# Patient Record
Sex: MALE | Race: BLACK OR AFRICAN AMERICAN | NOT HISPANIC OR LATINO | Employment: STUDENT | URBAN - METROPOLITAN AREA
[De-identification: names, ages, dates, MRNs, and addresses within clinical notes are randomized per-mention and may not be internally consistent; named-entity substitution may affect disease eponyms.]

---

## 2021-04-22 ENCOUNTER — TELEPHONE (OUTPATIENT)
Dept: ORTHOPEDICS | Facility: CLINIC | Age: 21
End: 2021-04-22

## 2021-04-22 ENCOUNTER — HOSPITAL ENCOUNTER (OUTPATIENT)
Dept: RADIOLOGY | Facility: HOSPITAL | Age: 21
Discharge: HOME | End: 2021-04-22
Attending: ORTHOPAEDIC SURGERY
Payer: COMMERCIAL

## 2021-04-22 ENCOUNTER — OFFICE VISIT (OUTPATIENT)
Dept: ORTHOPEDICS | Facility: CLINIC | Age: 21
End: 2021-04-22
Payer: COMMERCIAL

## 2021-04-22 DIAGNOSIS — M25.562 LEFT KNEE PAIN, UNSPECIFIED CHRONICITY: Primary | ICD-10-CM

## 2021-04-22 DIAGNOSIS — Z98.890 HX OF LEFT KNEE SURGERY: ICD-10-CM

## 2021-04-22 DIAGNOSIS — M23.50 CHRONIC INSTABILITY OF KNEE, UNSPECIFIED LATERALITY: ICD-10-CM

## 2021-04-22 DIAGNOSIS — M25.562 LEFT KNEE PAIN, UNSPECIFIED CHRONICITY: ICD-10-CM

## 2021-04-22 DIAGNOSIS — M23.52 RECURRENT LEFT KNEE INSTABILITY: Primary | ICD-10-CM

## 2021-04-22 DIAGNOSIS — Q74.1 BIPARTITE PATELLA: ICD-10-CM

## 2021-04-22 PROCEDURE — 99203 OFFICE O/P NEW LOW 30 MIN: CPT | Mod: S$GLB,,, | Performed by: ORTHOPAEDIC SURGERY

## 2021-04-22 PROCEDURE — 73562 X-RAY EXAM OF KNEE 3: CPT | Mod: 26,RT,, | Performed by: RADIOLOGY

## 2021-04-22 PROCEDURE — 1125F AMNT PAIN NOTED PAIN PRSNT: CPT | Mod: S$GLB,,, | Performed by: ORTHOPAEDIC SURGERY

## 2021-04-22 PROCEDURE — 99999 PR PBB SHADOW E&M-NEW PATIENT-LVL II: ICD-10-PCS | Mod: PBBFAC,,, | Performed by: ORTHOPAEDIC SURGERY

## 2021-04-22 PROCEDURE — 99203 PR OFFICE/OUTPT VISIT, NEW, LEVL III, 30-44 MIN: ICD-10-PCS | Mod: S$GLB,,, | Performed by: ORTHOPAEDIC SURGERY

## 2021-04-22 PROCEDURE — 73564 X-RAY EXAM KNEE 4 OR MORE: CPT | Mod: 26,LT,, | Performed by: RADIOLOGY

## 2021-04-22 PROCEDURE — 1125F PR PAIN SEVERITY QUANTIFIED, PAIN PRESENT: ICD-10-PCS | Mod: S$GLB,,, | Performed by: ORTHOPAEDIC SURGERY

## 2021-04-22 PROCEDURE — 73562 XR KNEE ORTHO LEFT WITH FLEXION: ICD-10-PCS | Mod: 26,RT,, | Performed by: RADIOLOGY

## 2021-04-22 PROCEDURE — 99999 PR PBB SHADOW E&M-NEW PATIENT-LVL II: CPT | Mod: PBBFAC,,, | Performed by: ORTHOPAEDIC SURGERY

## 2021-04-22 PROCEDURE — 73564 XR KNEE ORTHO LEFT WITH FLEXION: ICD-10-PCS | Mod: 26,LT,, | Performed by: RADIOLOGY

## 2021-04-22 PROCEDURE — 73564 X-RAY EXAM KNEE 4 OR MORE: CPT | Mod: TC,LT

## 2021-04-23 ENCOUNTER — TELEPHONE (OUTPATIENT)
Dept: ORTHOPEDICS | Facility: CLINIC | Age: 21
End: 2021-04-23

## 2021-04-28 ENCOUNTER — OFFICE VISIT (OUTPATIENT)
Dept: ORTHOPEDICS | Facility: CLINIC | Age: 21
End: 2021-04-28
Payer: COMMERCIAL

## 2021-04-28 ENCOUNTER — HOSPITAL ENCOUNTER (OUTPATIENT)
Dept: RADIOLOGY | Facility: HOSPITAL | Age: 21
Discharge: HOME | End: 2021-04-28
Attending: PHYSICIAN ASSISTANT
Payer: COMMERCIAL

## 2021-04-28 DIAGNOSIS — M23.50 CHRONIC INSTABILITY OF KNEE, UNSPECIFIED LATERALITY: ICD-10-CM

## 2021-04-28 DIAGNOSIS — M23.52 RECURRENT LEFT KNEE INSTABILITY: Primary | ICD-10-CM

## 2021-04-28 DIAGNOSIS — Z98.890 HX OF LEFT KNEE SURGERY: ICD-10-CM

## 2021-04-28 DIAGNOSIS — Q74.1 BIPARTITE PATELLA: ICD-10-CM

## 2021-04-28 PROCEDURE — 99999 PR PBB SHADOW E&M-EST. PATIENT-LVL II: ICD-10-PCS | Mod: PBBFAC,,, | Performed by: ORTHOPAEDIC SURGERY

## 2021-04-28 PROCEDURE — 73721 MRI KNEE WITHOUT CONTRAST LEFT: ICD-10-PCS | Mod: 26,LT,, | Performed by: RADIOLOGY

## 2021-04-28 PROCEDURE — 73721 MRI JNT OF LWR EXTRE W/O DYE: CPT | Mod: 26,LT,, | Performed by: RADIOLOGY

## 2021-04-28 PROCEDURE — 73721 MRI JNT OF LWR EXTRE W/O DYE: CPT | Mod: TC,LT

## 2021-04-28 PROCEDURE — 99214 OFFICE O/P EST MOD 30 MIN: CPT | Mod: S$GLB,,, | Performed by: ORTHOPAEDIC SURGERY

## 2021-04-28 PROCEDURE — 99214 PR OFFICE/OUTPT VISIT, EST, LEVL IV, 30-39 MIN: ICD-10-PCS | Mod: S$GLB,,, | Performed by: ORTHOPAEDIC SURGERY

## 2021-04-28 PROCEDURE — 1126F PR PAIN SEVERITY QUANTIFIED, NO PAIN PRESENT: ICD-10-PCS | Mod: S$GLB,,, | Performed by: ORTHOPAEDIC SURGERY

## 2021-04-28 PROCEDURE — 99999 PR PBB SHADOW E&M-EST. PATIENT-LVL II: CPT | Mod: PBBFAC,,, | Performed by: ORTHOPAEDIC SURGERY

## 2021-04-28 PROCEDURE — 1126F AMNT PAIN NOTED NONE PRSNT: CPT | Mod: S$GLB,,, | Performed by: ORTHOPAEDIC SURGERY

## 2021-05-11 ENCOUNTER — TELEPHONE (OUTPATIENT)
Dept: ORTHOPEDICS | Facility: CLINIC | Age: 21
End: 2021-05-11

## 2021-06-21 ENCOUNTER — OFFICE VISIT (OUTPATIENT)
Dept: ORTHOPEDICS | Facility: CLINIC | Age: 21
End: 2021-06-21
Payer: COMMERCIAL

## 2021-06-21 DIAGNOSIS — Z01.818 PREOP TESTING: ICD-10-CM

## 2021-06-21 DIAGNOSIS — Q74.1 BIPARTITE PATELLA: ICD-10-CM

## 2021-06-21 DIAGNOSIS — M23.52 RECURRENT LEFT KNEE INSTABILITY: Primary | ICD-10-CM

## 2021-06-21 DIAGNOSIS — Z98.890 HX OF LEFT KNEE SURGERY: ICD-10-CM

## 2021-06-21 PROCEDURE — 1126F PR PAIN SEVERITY QUANTIFIED, NO PAIN PRESENT: ICD-10-PCS | Mod: S$GLB,,, | Performed by: ORTHOPAEDIC SURGERY

## 2021-06-21 PROCEDURE — 99999 PR PBB SHADOW E&M-EST. PATIENT-LVL III: CPT | Mod: PBBFAC,,, | Performed by: ORTHOPAEDIC SURGERY

## 2021-06-21 PROCEDURE — 99999 PR PBB SHADOW E&M-EST. PATIENT-LVL III: ICD-10-PCS | Mod: PBBFAC,,, | Performed by: ORTHOPAEDIC SURGERY

## 2021-06-21 PROCEDURE — 1126F AMNT PAIN NOTED NONE PRSNT: CPT | Mod: S$GLB,,, | Performed by: ORTHOPAEDIC SURGERY

## 2021-06-21 PROCEDURE — 99213 PR OFFICE/OUTPT VISIT, EST, LEVL III, 20-29 MIN: ICD-10-PCS | Mod: S$GLB,,, | Performed by: ORTHOPAEDIC SURGERY

## 2021-06-21 PROCEDURE — 99213 OFFICE O/P EST LOW 20 MIN: CPT | Mod: S$GLB,,, | Performed by: ORTHOPAEDIC SURGERY

## 2021-06-23 ENCOUNTER — TELEPHONE (OUTPATIENT)
Dept: PREADMISSION TESTING | Facility: HOSPITAL | Age: 21
End: 2021-06-23

## 2021-06-23 VITALS — WEIGHT: 190 LBS | BODY MASS INDEX: 25.18 KG/M2 | HEIGHT: 73 IN

## 2021-06-23 DIAGNOSIS — Z01.818 PRE-OP TESTING: Primary | ICD-10-CM

## 2021-06-24 ENCOUNTER — ANESTHESIA EVENT (OUTPATIENT)
Dept: SURGERY | Facility: HOSPITAL | Age: 21
End: 2021-06-24
Payer: COMMERCIAL

## 2021-06-29 ENCOUNTER — LAB VISIT (OUTPATIENT)
Dept: OTOLARYNGOLOGY | Facility: CLINIC | Age: 21
End: 2021-06-29
Payer: COMMERCIAL

## 2021-06-29 ENCOUNTER — LAB VISIT (OUTPATIENT)
Dept: LAB | Facility: HOSPITAL | Age: 21
End: 2021-06-29
Attending: ORTHOPAEDIC SURGERY
Payer: COMMERCIAL

## 2021-06-29 DIAGNOSIS — Z01.818 PRE-OP TESTING: ICD-10-CM

## 2021-06-29 DIAGNOSIS — Z01.818 PREOP TESTING: ICD-10-CM

## 2021-06-29 LAB
APTT BLDCRRT: 27.2 SEC (ref 21–32)
BASOPHILS # BLD AUTO: 0.05 K/UL (ref 0–0.2)
BASOPHILS NFR BLD: 0.9 % (ref 0–1.9)
DIFFERENTIAL METHOD: ABNORMAL
EOSINOPHIL # BLD AUTO: 0.1 K/UL (ref 0–0.5)
EOSINOPHIL NFR BLD: 2.1 % (ref 0–8)
ERYTHROCYTE [DISTWIDTH] IN BLOOD BY AUTOMATED COUNT: 11.7 % (ref 11.5–14.5)
HCT VFR BLD AUTO: 42.2 % (ref 40–54)
HGB BLD-MCNC: 14.1 G/DL (ref 14–18)
IMM GRANULOCYTES # BLD AUTO: 0.02 K/UL (ref 0–0.04)
IMM GRANULOCYTES NFR BLD AUTO: 0.4 % (ref 0–0.5)
INR PPP: 1 (ref 0.8–1.2)
LYMPHOCYTES # BLD AUTO: 2 K/UL (ref 1–4.8)
LYMPHOCYTES NFR BLD: 38.4 % (ref 18–48)
MCH RBC QN AUTO: 31.4 PG (ref 27–31)
MCHC RBC AUTO-ENTMCNC: 33.4 G/DL (ref 32–36)
MCV RBC AUTO: 94 FL (ref 82–98)
MONOCYTES # BLD AUTO: 0.5 K/UL (ref 0.3–1)
MONOCYTES NFR BLD: 9.2 % (ref 4–15)
NEUTROPHILS # BLD AUTO: 2.6 K/UL (ref 1.8–7.7)
NEUTROPHILS NFR BLD: 49 % (ref 38–73)
NRBC BLD-RTO: 0 /100 WBC
PLATELET # BLD AUTO: 173 K/UL (ref 150–450)
PMV BLD AUTO: 13.3 FL (ref 9.2–12.9)
PROTHROMBIN TIME: 11.2 SEC (ref 9–12.5)
RBC # BLD AUTO: 4.49 M/UL (ref 4.6–6.2)
WBC # BLD AUTO: 5.31 K/UL (ref 3.9–12.7)

## 2021-06-29 PROCEDURE — 36415 COLL VENOUS BLD VENIPUNCTURE: CPT | Performed by: ORTHOPAEDIC SURGERY

## 2021-06-29 PROCEDURE — 85025 COMPLETE CBC W/AUTO DIFF WBC: CPT | Performed by: ORTHOPAEDIC SURGERY

## 2021-06-29 PROCEDURE — 80048 BASIC METABOLIC PNL TOTAL CA: CPT | Performed by: ORTHOPAEDIC SURGERY

## 2021-06-29 PROCEDURE — 85730 THROMBOPLASTIN TIME PARTIAL: CPT | Performed by: ORTHOPAEDIC SURGERY

## 2021-06-29 PROCEDURE — U0005 INFEC AGEN DETEC AMPLI PROBE: HCPCS | Performed by: ORTHOPAEDIC SURGERY

## 2021-06-29 PROCEDURE — U0003 INFECTIOUS AGENT DETECTION BY NUCLEIC ACID (DNA OR RNA); SEVERE ACUTE RESPIRATORY SYNDROME CORONAVIRUS 2 (SARS-COV-2) (CORONAVIRUS DISEASE [COVID-19]), AMPLIFIED PROBE TECHNIQUE, MAKING USE OF HIGH THROUGHPUT TECHNOLOGIES AS DESCRIBED BY CMS-2020-01-R: HCPCS | Performed by: ORTHOPAEDIC SURGERY

## 2021-06-29 PROCEDURE — 85610 PROTHROMBIN TIME: CPT | Performed by: ORTHOPAEDIC SURGERY

## 2021-06-30 LAB
ANION GAP SERPL CALC-SCNC: 11 MMOL/L (ref 8–16)
BUN SERPL-MCNC: 11 MG/DL (ref 6–20)
CALCIUM SERPL-MCNC: 10 MG/DL (ref 8.7–10.5)
CHLORIDE SERPL-SCNC: 104 MMOL/L (ref 95–110)
CO2 SERPL-SCNC: 25 MMOL/L (ref 23–29)
CREAT SERPL-MCNC: 1.3 MG/DL (ref 0.5–1.4)
EST. GFR  (AFRICAN AMERICAN): >60 ML/MIN/1.73 M^2
EST. GFR  (NON AFRICAN AMERICAN): >60 ML/MIN/1.73 M^2
GLUCOSE SERPL-MCNC: 83 MG/DL (ref 70–110)
POTASSIUM SERPL-SCNC: 4.4 MMOL/L (ref 3.5–5.1)
SARS-COV-2 RNA RESP QL NAA+PROBE: NOT DETECTED
SODIUM SERPL-SCNC: 140 MMOL/L (ref 136–145)

## 2021-07-01 ENCOUNTER — TELEPHONE (OUTPATIENT)
Dept: ORTHOPEDICS | Facility: CLINIC | Age: 21
End: 2021-07-01

## 2021-07-01 ENCOUNTER — TELEPHONE (OUTPATIENT)
Dept: PREADMISSION TESTING | Facility: HOSPITAL | Age: 21
End: 2021-07-01

## 2021-07-02 ENCOUNTER — HOSPITAL ENCOUNTER (OUTPATIENT)
Facility: HOSPITAL | Age: 21
Discharge: HOME | End: 2021-07-02
Attending: ORTHOPAEDIC SURGERY | Admitting: ORTHOPAEDIC SURGERY
Payer: COMMERCIAL

## 2021-07-02 ENCOUNTER — HOSPITAL ENCOUNTER (OUTPATIENT)
Dept: RADIOLOGY | Facility: HOSPITAL | Age: 21
Discharge: HOME | End: 2021-07-02
Attending: ORTHOPAEDIC SURGERY | Admitting: ORTHOPAEDIC SURGERY
Payer: COMMERCIAL

## 2021-07-02 ENCOUNTER — ANESTHESIA (OUTPATIENT)
Dept: SURGERY | Facility: HOSPITAL | Age: 21
End: 2021-07-02
Payer: COMMERCIAL

## 2021-07-02 ENCOUNTER — TELEPHONE (OUTPATIENT)
Dept: PHARMACY | Facility: CLINIC | Age: 21
End: 2021-07-02

## 2021-07-02 VITALS
SYSTOLIC BLOOD PRESSURE: 132 MMHG | HEIGHT: 73 IN | WEIGHT: 188.69 LBS | TEMPERATURE: 98 F | RESPIRATION RATE: 20 BRPM | BODY MASS INDEX: 25.01 KG/M2 | HEART RATE: 100 BPM | DIASTOLIC BLOOD PRESSURE: 84 MMHG | OXYGEN SATURATION: 100 %

## 2021-07-02 DIAGNOSIS — M23.52 RECURRENT LEFT KNEE INSTABILITY: Primary | ICD-10-CM

## 2021-07-02 DIAGNOSIS — M25.362 KNEE INSTABILITY, LEFT: ICD-10-CM

## 2021-07-02 PROCEDURE — 36000711: Performed by: ORTHOPAEDIC SURGERY

## 2021-07-02 PROCEDURE — 36000710: Performed by: ORTHOPAEDIC SURGERY

## 2021-07-02 PROCEDURE — 27832 TREAT LOWER LEG DISLOCATION: CPT | Mod: 80,LT,, | Performed by: STUDENT IN AN ORGANIZED HEALTH CARE EDUCATION/TRAINING PROGRAM

## 2021-07-02 PROCEDURE — 71000033 HC RECOVERY, INTIAL HOUR: Performed by: ORTHOPAEDIC SURGERY

## 2021-07-02 PROCEDURE — 73590 XR TIBIA FIBULA 2 VIEW LEFT: ICD-10-PCS | Mod: 26,LT,, | Performed by: RADIOLOGY

## 2021-07-02 PROCEDURE — 37000008 HC ANESTHESIA 1ST 15 MINUTES: Performed by: ORTHOPAEDIC SURGERY

## 2021-07-02 PROCEDURE — 27832: ICD-10-PCS | Mod: 80,LT,, | Performed by: STUDENT IN AN ORGANIZED HEALTH CARE EDUCATION/TRAINING PROGRAM

## 2021-07-02 PROCEDURE — C1713 ANCHOR/SCREW BN/BN,TIS/BN: HCPCS | Performed by: ORTHOPAEDIC SURGERY

## 2021-07-02 PROCEDURE — 63600175 PHARM REV CODE 636 W HCPCS: Performed by: PHYSICIAN ASSISTANT

## 2021-07-02 PROCEDURE — D9220A PRA ANESTHESIA: ICD-10-PCS | Mod: ,,, | Performed by: ANESTHESIOLOGY

## 2021-07-02 PROCEDURE — 64708 PR NEUROPLASTY OTHER ARM/LEG NERVE,OPEN: ICD-10-PCS | Mod: 51,LT,, | Performed by: ORTHOPAEDIC SURGERY

## 2021-07-02 PROCEDURE — 37000009 HC ANESTHESIA EA ADD 15 MINS: Performed by: ORTHOPAEDIC SURGERY

## 2021-07-02 PROCEDURE — 29881 PR KNEE SCOPE SINGLE MENISECECTOMY: ICD-10-PCS | Mod: 80,51,LT, | Performed by: STUDENT IN AN ORGANIZED HEALTH CARE EDUCATION/TRAINING PROGRAM

## 2021-07-02 PROCEDURE — D9220A PRA ANESTHESIA: Mod: ,,, | Performed by: ANESTHESIOLOGY

## 2021-07-02 PROCEDURE — 63600175 PHARM REV CODE 636 W HCPCS: Performed by: ANESTHESIOLOGY

## 2021-07-02 PROCEDURE — 64708 REVISE ARM/LEG NERVE: CPT | Mod: 80,51,LT, | Performed by: STUDENT IN AN ORGANIZED HEALTH CARE EDUCATION/TRAINING PROGRAM

## 2021-07-02 PROCEDURE — 63600175 PHARM REV CODE 636 W HCPCS: Performed by: ORTHOPAEDIC SURGERY

## 2021-07-02 PROCEDURE — D9220A PRA ANESTHESIA: Mod: ,,, | Performed by: NURSE ANESTHETIST, CERTIFIED REGISTERED

## 2021-07-02 PROCEDURE — 73590 X-RAY EXAM OF LOWER LEG: CPT | Mod: 26,LT,, | Performed by: RADIOLOGY

## 2021-07-02 PROCEDURE — 63600175 PHARM REV CODE 636 W HCPCS: Performed by: NURSE ANESTHETIST, CERTIFIED REGISTERED

## 2021-07-02 PROCEDURE — 29881 ARTHRS KNE SRG MNISECTMY M/L: CPT | Mod: 51,LT,, | Performed by: ORTHOPAEDIC SURGERY

## 2021-07-02 PROCEDURE — 27832 TREAT LOWER LEG DISLOCATION: CPT | Mod: LT,,, | Performed by: ORTHOPAEDIC SURGERY

## 2021-07-02 PROCEDURE — 25000003 PHARM REV CODE 250: Performed by: NURSE ANESTHETIST, CERTIFIED REGISTERED

## 2021-07-02 PROCEDURE — 71000039 HC RECOVERY, EACH ADD'L HOUR: Performed by: ORTHOPAEDIC SURGERY

## 2021-07-02 PROCEDURE — 29881 ARTHRS KNE SRG MNISECTMY M/L: CPT | Mod: 80,51,LT, | Performed by: STUDENT IN AN ORGANIZED HEALTH CARE EDUCATION/TRAINING PROGRAM

## 2021-07-02 PROCEDURE — 27832: ICD-10-PCS | Mod: LT,,, | Performed by: ORTHOPAEDIC SURGERY

## 2021-07-02 PROCEDURE — 64708 REVISE ARM/LEG NERVE: CPT | Mod: 51,LT,, | Performed by: ORTHOPAEDIC SURGERY

## 2021-07-02 PROCEDURE — 64708 PR NEUROPLASTY OTHER ARM/LEG NERVE,OPEN: ICD-10-PCS | Mod: 80,51,LT, | Performed by: STUDENT IN AN ORGANIZED HEALTH CARE EDUCATION/TRAINING PROGRAM

## 2021-07-02 PROCEDURE — 27201423 OPTIME MED/SURG SUP & DEVICES STERILE SUPPLY: Performed by: ORTHOPAEDIC SURGERY

## 2021-07-02 PROCEDURE — D9220A PRA ANESTHESIA: ICD-10-PCS | Mod: ,,, | Performed by: NURSE ANESTHETIST, CERTIFIED REGISTERED

## 2021-07-02 PROCEDURE — 25000003 PHARM REV CODE 250: Performed by: ORTHOPAEDIC SURGERY

## 2021-07-02 PROCEDURE — 73590 X-RAY EXAM OF LOWER LEG: CPT | Mod: TC,LT

## 2021-07-02 PROCEDURE — 29881 PR KNEE SCOPE SINGLE MENISECECTOMY: ICD-10-PCS | Mod: 51,LT,, | Performed by: ORTHOPAEDIC SURGERY

## 2021-07-02 DEVICE — IMPLANTABLE DEVICE: Type: IMPLANTABLE DEVICE | Site: KNEE | Status: FUNCTIONAL

## 2021-07-02 RX ORDER — ASPIRIN 325 MG
325 TABLET, DELAYED RELEASE (ENTERIC COATED) ORAL DAILY
Qty: 30 TABLET | Refills: 0 | Status: SHIPPED | OUTPATIENT
Start: 2021-07-02 | End: 2022-07-02

## 2021-07-02 RX ORDER — MIDAZOLAM HYDROCHLORIDE 1 MG/ML
INJECTION INTRAMUSCULAR; INTRAVENOUS
Status: DISCONTINUED | OUTPATIENT
Start: 2021-07-02 | End: 2021-07-02

## 2021-07-02 RX ORDER — OXYCODONE AND ACETAMINOPHEN 5; 325 MG/1; MG/1
1 TABLET ORAL EVERY 6 HOURS PRN
Qty: 60 TABLET | Refills: 0 | Status: SHIPPED | OUTPATIENT
Start: 2021-07-02

## 2021-07-02 RX ORDER — BUPIVACAINE HYDROCHLORIDE 5 MG/ML
INJECTION, SOLUTION EPIDURAL; INTRACAUDAL
Status: DISCONTINUED
Start: 2021-07-02 | End: 2021-07-02 | Stop reason: HOSPADM

## 2021-07-02 RX ORDER — DEXAMETHASONE SODIUM PHOSPHATE 4 MG/ML
INJECTION, SOLUTION INTRA-ARTICULAR; INTRALESIONAL; INTRAMUSCULAR; INTRAVENOUS; SOFT TISSUE
Status: DISCONTINUED | OUTPATIENT
Start: 2021-07-02 | End: 2021-07-02

## 2021-07-02 RX ORDER — FENTANYL CITRATE 50 UG/ML
25 INJECTION, SOLUTION INTRAMUSCULAR; INTRAVENOUS EVERY 5 MIN PRN
Status: DISCONTINUED | OUTPATIENT
Start: 2021-07-02 | End: 2021-07-02 | Stop reason: HOSPADM

## 2021-07-02 RX ORDER — BUPIVACAINE HYDROCHLORIDE 2.5 MG/ML
INJECTION, SOLUTION EPIDURAL; INFILTRATION; INTRACAUDAL
Status: DISCONTINUED
Start: 2021-07-02 | End: 2021-07-02 | Stop reason: WASHOUT

## 2021-07-02 RX ORDER — FENTANYL CITRATE 50 UG/ML
INJECTION, SOLUTION INTRAMUSCULAR; INTRAVENOUS
Status: DISCONTINUED | OUTPATIENT
Start: 2021-07-02 | End: 2021-07-02

## 2021-07-02 RX ORDER — DIPHENHYDRAMINE HYDROCHLORIDE 50 MG/ML
25 INJECTION INTRAMUSCULAR; INTRAVENOUS EVERY 6 HOURS PRN
Status: DISCONTINUED | OUTPATIENT
Start: 2021-07-02 | End: 2021-07-02 | Stop reason: HOSPADM

## 2021-07-02 RX ORDER — LIDOCAINE HYDROCHLORIDE 20 MG/ML
INJECTION, SOLUTION EPIDURAL; INFILTRATION; INTRACAUDAL; PERINEURAL
Status: DISCONTINUED | OUTPATIENT
Start: 2021-07-02 | End: 2021-07-02

## 2021-07-02 RX ORDER — DEXMEDETOMIDINE HYDROCHLORIDE 100 UG/ML
INJECTION, SOLUTION INTRAVENOUS
Status: DISCONTINUED | OUTPATIENT
Start: 2021-07-02 | End: 2021-07-02

## 2021-07-02 RX ORDER — BUPIVACAINE HYDROCHLORIDE 5 MG/ML
INJECTION, SOLUTION EPIDURAL; INTRACAUDAL
Status: DISCONTINUED | OUTPATIENT
Start: 2021-07-02 | End: 2021-07-02 | Stop reason: HOSPADM

## 2021-07-02 RX ORDER — NEOSTIGMINE METHYLSULFATE 1 MG/ML
INJECTION, SOLUTION INTRAVENOUS
Status: DISCONTINUED | OUTPATIENT
Start: 2021-07-02 | End: 2021-07-02

## 2021-07-02 RX ORDER — ONDANSETRON 2 MG/ML
INJECTION INTRAMUSCULAR; INTRAVENOUS
Status: DISCONTINUED | OUTPATIENT
Start: 2021-07-02 | End: 2021-07-02

## 2021-07-02 RX ORDER — MEPERIDINE HYDROCHLORIDE 25 MG/ML
12.5 INJECTION INTRAMUSCULAR; INTRAVENOUS; SUBCUTANEOUS ONCE
Status: DISCONTINUED | OUTPATIENT
Start: 2021-07-02 | End: 2021-07-02 | Stop reason: HOSPADM

## 2021-07-02 RX ORDER — LIDOCAINE HYDROCHLORIDE 10 MG/ML
INJECTION, SOLUTION EPIDURAL; INFILTRATION; INTRACAUDAL; PERINEURAL
Status: DISCONTINUED
Start: 2021-07-02 | End: 2021-07-02 | Stop reason: HOSPADM

## 2021-07-02 RX ORDER — EPINEPHRINE 1 MG/ML
INJECTION, SOLUTION INTRACARDIAC; INTRAMUSCULAR; INTRAVENOUS; SUBCUTANEOUS
Status: DISCONTINUED | OUTPATIENT
Start: 2021-07-02 | End: 2021-07-02 | Stop reason: HOSPADM

## 2021-07-02 RX ORDER — CHLORHEXIDINE GLUCONATE ORAL RINSE 1.2 MG/ML
10 SOLUTION DENTAL
Status: DISCONTINUED | OUTPATIENT
Start: 2021-07-02 | End: 2021-07-02 | Stop reason: HOSPADM

## 2021-07-02 RX ORDER — LIDOCAINE HYDROCHLORIDE 10 MG/ML
INJECTION, SOLUTION EPIDURAL; INFILTRATION; INTRACAUDAL; PERINEURAL
Status: DISCONTINUED | OUTPATIENT
Start: 2021-07-02 | End: 2021-07-02 | Stop reason: HOSPADM

## 2021-07-02 RX ORDER — PROPOFOL 10 MG/ML
VIAL (ML) INTRAVENOUS
Status: DISCONTINUED | OUTPATIENT
Start: 2021-07-02 | End: 2021-07-02

## 2021-07-02 RX ORDER — SODIUM CHLORIDE, SODIUM LACTATE, POTASSIUM CHLORIDE, CALCIUM CHLORIDE 600; 310; 30; 20 MG/100ML; MG/100ML; MG/100ML; MG/100ML
INJECTION, SOLUTION INTRAVENOUS CONTINUOUS
Status: DISCONTINUED | OUTPATIENT
Start: 2021-07-02 | End: 2021-07-02 | Stop reason: HOSPADM

## 2021-07-02 RX ORDER — ALBUTEROL SULFATE 0.83 MG/ML
2.5 SOLUTION RESPIRATORY (INHALATION) EVERY 4 HOURS PRN
Status: DISCONTINUED | OUTPATIENT
Start: 2021-07-02 | End: 2021-07-02 | Stop reason: HOSPADM

## 2021-07-02 RX ORDER — HYDROCODONE BITARTRATE AND ACETAMINOPHEN 5; 325 MG/1; MG/1
1 TABLET ORAL
Status: DISCONTINUED | OUTPATIENT
Start: 2021-07-02 | End: 2021-07-02 | Stop reason: HOSPADM

## 2021-07-02 RX ORDER — ROCURONIUM BROMIDE 10 MG/ML
INJECTION, SOLUTION INTRAVENOUS
Status: DISCONTINUED | OUTPATIENT
Start: 2021-07-02 | End: 2021-07-02

## 2021-07-02 RX ORDER — CEFAZOLIN SODIUM 2 G/50ML
2 SOLUTION INTRAVENOUS
Status: COMPLETED | OUTPATIENT
Start: 2021-07-02 | End: 2021-07-02

## 2021-07-02 RX ORDER — EPINEPHRINE 1 MG/ML
INJECTION, SOLUTION INTRACARDIAC; INTRAMUSCULAR; INTRAVENOUS; SUBCUTANEOUS
Status: DISCONTINUED
Start: 2021-07-02 | End: 2021-07-02 | Stop reason: HOSPADM

## 2021-07-02 RX ORDER — ONDANSETRON 2 MG/ML
4 INJECTION INTRAMUSCULAR; INTRAVENOUS ONCE AS NEEDED
Status: COMPLETED | OUTPATIENT
Start: 2021-07-02 | End: 2021-07-02

## 2021-07-02 RX ORDER — OXYCODONE HYDROCHLORIDE 5 MG/1
5 CAPSULE ORAL EVERY 6 HOURS PRN
Qty: 10 CAPSULE | Refills: 0 | Status: SHIPPED | OUTPATIENT
Start: 2021-07-02 | End: 2021-07-15

## 2021-07-02 RX ORDER — ONDANSETRON 4 MG/1
4 TABLET, FILM COATED ORAL EVERY 12 HOURS PRN
Qty: 20 TABLET | Refills: 0 | Status: SHIPPED | OUTPATIENT
Start: 2021-07-02 | End: 2021-07-15

## 2021-07-02 RX ORDER — ACETAMINOPHEN 10 MG/ML
INJECTION, SOLUTION INTRAVENOUS
Status: DISCONTINUED | OUTPATIENT
Start: 2021-07-02 | End: 2021-07-02

## 2021-07-02 RX ORDER — CEPHALEXIN 500 MG/1
500 CAPSULE ORAL EVERY 12 HOURS
Qty: 10 CAPSULE | Refills: 0 | Status: SHIPPED | OUTPATIENT
Start: 2021-07-02 | End: 2021-07-15

## 2021-07-02 RX ORDER — ONDANSETRON 2 MG/ML
INJECTION INTRAMUSCULAR; INTRAVENOUS
Status: DISCONTINUED
Start: 2021-07-02 | End: 2021-07-02 | Stop reason: WASHOUT

## 2021-07-02 RX ORDER — SODIUM CHLORIDE 9 MG/ML
INJECTION, SOLUTION INTRAVENOUS CONTINUOUS
Status: DISCONTINUED | OUTPATIENT
Start: 2021-07-02 | End: 2021-07-02 | Stop reason: HOSPADM

## 2021-07-02 RX ORDER — DOCUSATE SODIUM 100 MG/1
100 CAPSULE, LIQUID FILLED ORAL 2 TIMES DAILY PRN
Qty: 20 CAPSULE | Refills: 0 | Status: SHIPPED | OUTPATIENT
Start: 2021-07-02 | End: 2021-07-15

## 2021-07-02 RX ADMIN — MIDAZOLAM HYDROCHLORIDE 2 MG: 1 INJECTION INTRAMUSCULAR; INTRAVENOUS at 06:07

## 2021-07-02 RX ADMIN — FENTANYL CITRATE 25 MCG: 50 INJECTION, SOLUTION INTRAMUSCULAR; INTRAVENOUS at 10:07

## 2021-07-02 RX ADMIN — FENTANYL CITRATE 100 MCG: 50 INJECTION, SOLUTION INTRAMUSCULAR; INTRAVENOUS at 07:07

## 2021-07-02 RX ADMIN — DEXMEDETOMIDINE HYDROCHLORIDE 8 MCG: 100 INJECTION, SOLUTION INTRAVENOUS at 09:07

## 2021-07-02 RX ADMIN — FENTANYL CITRATE 25 MCG: 50 INJECTION, SOLUTION INTRAMUSCULAR; INTRAVENOUS at 09:07

## 2021-07-02 RX ADMIN — SODIUM CHLORIDE, SODIUM LACTATE, POTASSIUM CHLORIDE, AND CALCIUM CHLORIDE: 600; 310; 30; 20 INJECTION, SOLUTION INTRAVENOUS at 08:07

## 2021-07-02 RX ADMIN — FENTANYL CITRATE 50 MCG: 50 INJECTION, SOLUTION INTRAMUSCULAR; INTRAVENOUS at 08:07

## 2021-07-02 RX ADMIN — NEOSTIGMINE METHYLSULFATE 3 MG: 1 INJECTION INTRAVENOUS at 09:07

## 2021-07-02 RX ADMIN — SODIUM CHLORIDE, SODIUM LACTATE, POTASSIUM CHLORIDE, AND CALCIUM CHLORIDE: 600; 310; 30; 20 INJECTION, SOLUTION INTRAVENOUS at 06:07

## 2021-07-02 RX ADMIN — LIDOCAINE HYDROCHLORIDE 80 MG: 20 INJECTION, SOLUTION EPIDURAL; INFILTRATION; INTRACAUDAL; PERINEURAL at 07:07

## 2021-07-02 RX ADMIN — GLYCOPYRROLATE 0.2 MG: 0.2 INJECTION, SOLUTION INTRAMUSCULAR; INTRAVITREAL at 10:07

## 2021-07-02 RX ADMIN — ACETAMINOPHEN 1000 MG: 10 INJECTION, SOLUTION INTRAVENOUS at 08:07

## 2021-07-02 RX ADMIN — ONDANSETRON 4 MG: 2 INJECTION, SOLUTION INTRAMUSCULAR; INTRAVENOUS at 11:07

## 2021-07-02 RX ADMIN — DEXMEDETOMIDINE HYDROCHLORIDE 8 MCG: 100 INJECTION, SOLUTION INTRAVENOUS at 10:07

## 2021-07-02 RX ADMIN — DEXAMETHASONE SODIUM PHOSPHATE 8 MG: 4 INJECTION, SOLUTION INTRA-ARTICULAR; INTRALESIONAL; INTRAMUSCULAR; INTRAVENOUS; SOFT TISSUE at 07:07

## 2021-07-02 RX ADMIN — ROCURONIUM BROMIDE 5 MG: 10 INJECTION, SOLUTION INTRAVENOUS at 07:07

## 2021-07-02 RX ADMIN — CEFAZOLIN SODIUM 2 G: 2 SOLUTION INTRAVENOUS at 06:07

## 2021-07-02 RX ADMIN — ONDANSETRON 4 MG: 2 INJECTION, SOLUTION INTRAMUSCULAR; INTRAVENOUS at 08:07

## 2021-07-02 RX ADMIN — ROCURONIUM BROMIDE 45 MG: 10 INJECTION, SOLUTION INTRAVENOUS at 07:07

## 2021-07-02 RX ADMIN — PROPOFOL 200 MG: 10 INJECTION, EMULSION INTRAVENOUS at 07:07

## 2021-07-02 RX ADMIN — GLYCOPYRROLATE 0.4 MG: 0.2 INJECTION, SOLUTION INTRAMUSCULAR; INTRAVITREAL at 09:07

## 2021-07-06 ENCOUNTER — CLINICAL SUPPORT (OUTPATIENT)
Dept: REHABILITATION | Facility: HOSPITAL | Age: 21
End: 2021-07-06
Payer: COMMERCIAL

## 2021-07-06 DIAGNOSIS — Z98.890 HX OF LEFT KNEE SURGERY: ICD-10-CM

## 2021-07-06 DIAGNOSIS — Q74.1 BIPARTITE PATELLA: ICD-10-CM

## 2021-07-06 DIAGNOSIS — M25.562 ACUTE PAIN OF LEFT KNEE: ICD-10-CM

## 2021-07-06 DIAGNOSIS — M23.52 RECURRENT LEFT KNEE INSTABILITY: Primary | ICD-10-CM

## 2021-07-06 DIAGNOSIS — M23.52 RECURRENT LEFT KNEE INSTABILITY: ICD-10-CM

## 2021-07-06 DIAGNOSIS — M25.662 DECREASED RANGE OF MOTION OF LEFT KNEE: ICD-10-CM

## 2021-07-06 PROCEDURE — 97110 THERAPEUTIC EXERCISES: CPT | Performed by: PHYSICAL THERAPIST

## 2021-07-06 PROCEDURE — 97162 PT EVAL MOD COMPLEX 30 MIN: CPT | Performed by: PHYSICAL THERAPIST

## 2021-07-06 PROCEDURE — 97140 MANUAL THERAPY 1/> REGIONS: CPT | Performed by: PHYSICAL THERAPIST

## 2021-07-08 ENCOUNTER — CLINICAL SUPPORT (OUTPATIENT)
Dept: REHABILITATION | Facility: HOSPITAL | Age: 21
End: 2021-07-08
Payer: COMMERCIAL

## 2021-07-08 DIAGNOSIS — M25.562 ACUTE PAIN OF LEFT KNEE: ICD-10-CM

## 2021-07-08 DIAGNOSIS — M25.662 DECREASED RANGE OF MOTION OF LEFT KNEE: ICD-10-CM

## 2021-07-08 PROCEDURE — 97140 MANUAL THERAPY 1/> REGIONS: CPT | Performed by: PHYSICAL THERAPIST

## 2021-07-08 PROCEDURE — 97110 THERAPEUTIC EXERCISES: CPT | Performed by: PHYSICAL THERAPIST

## 2021-07-09 ENCOUNTER — PATIENT MESSAGE (OUTPATIENT)
Dept: ORTHOPEDICS | Facility: CLINIC | Age: 21
End: 2021-07-09

## 2021-07-12 ENCOUNTER — CLINICAL SUPPORT (OUTPATIENT)
Dept: REHABILITATION | Facility: HOSPITAL | Age: 21
End: 2021-07-12
Payer: COMMERCIAL

## 2021-07-12 DIAGNOSIS — M25.562 ACUTE PAIN OF LEFT KNEE: ICD-10-CM

## 2021-07-12 DIAGNOSIS — M25.662 DECREASED RANGE OF MOTION OF LEFT KNEE: ICD-10-CM

## 2021-07-12 PROCEDURE — 97140 MANUAL THERAPY 1/> REGIONS: CPT | Performed by: PHYSICAL THERAPIST

## 2021-07-12 PROCEDURE — 97110 THERAPEUTIC EXERCISES: CPT | Performed by: PHYSICAL THERAPIST

## 2021-07-14 ENCOUNTER — TELEPHONE (OUTPATIENT)
Dept: ORTHOPEDICS | Facility: CLINIC | Age: 21
End: 2021-07-14

## 2021-07-14 DIAGNOSIS — M23.52 RECURRENT LEFT KNEE INSTABILITY: Primary | ICD-10-CM

## 2021-07-15 ENCOUNTER — OFFICE VISIT (OUTPATIENT)
Dept: ORTHOPEDICS | Facility: CLINIC | Age: 21
End: 2021-07-15
Payer: COMMERCIAL

## 2021-07-15 ENCOUNTER — CLINICAL SUPPORT (OUTPATIENT)
Dept: REHABILITATION | Facility: HOSPITAL | Age: 21
End: 2021-07-15
Payer: COMMERCIAL

## 2021-07-15 ENCOUNTER — HOSPITAL ENCOUNTER (OUTPATIENT)
Dept: RADIOLOGY | Facility: HOSPITAL | Age: 21
Discharge: HOME | End: 2021-07-15
Attending: PHYSICIAN ASSISTANT
Payer: COMMERCIAL

## 2021-07-15 VITALS — TEMPERATURE: 99 F

## 2021-07-15 DIAGNOSIS — M23.52 RECURRENT LEFT KNEE INSTABILITY: ICD-10-CM

## 2021-07-15 DIAGNOSIS — Z98.890 POST-OPERATIVE STATE: Primary | ICD-10-CM

## 2021-07-15 DIAGNOSIS — M25.662 DECREASED RANGE OF MOTION OF LEFT KNEE: ICD-10-CM

## 2021-07-15 DIAGNOSIS — M25.562 ACUTE PAIN OF LEFT KNEE: ICD-10-CM

## 2021-07-15 PROCEDURE — 1126F AMNT PAIN NOTED NONE PRSNT: CPT | Mod: S$GLB,,, | Performed by: PHYSICIAN ASSISTANT

## 2021-07-15 PROCEDURE — 99024 POSTOP FOLLOW-UP VISIT: CPT | Mod: S$GLB,,, | Performed by: PHYSICIAN ASSISTANT

## 2021-07-15 PROCEDURE — 99999 PR PBB SHADOW E&M-EST. PATIENT-LVL III: ICD-10-PCS | Mod: PBBFAC,,, | Performed by: PHYSICIAN ASSISTANT

## 2021-07-15 PROCEDURE — 73560 X-RAY EXAM OF KNEE 1 OR 2: CPT | Mod: 26,LT,, | Performed by: RADIOLOGY

## 2021-07-15 PROCEDURE — 99024 PR POST-OP FOLLOW-UP VISIT: ICD-10-PCS | Mod: S$GLB,,, | Performed by: PHYSICIAN ASSISTANT

## 2021-07-15 PROCEDURE — 73560 XR KNEE 1 OR 2 VIEW LEFT: ICD-10-PCS | Mod: 26,LT,, | Performed by: RADIOLOGY

## 2021-07-15 PROCEDURE — 97110 THERAPEUTIC EXERCISES: CPT

## 2021-07-15 PROCEDURE — 99999 PR PBB SHADOW E&M-EST. PATIENT-LVL III: CPT | Mod: PBBFAC,,, | Performed by: PHYSICIAN ASSISTANT

## 2021-07-15 PROCEDURE — 73560 X-RAY EXAM OF KNEE 1 OR 2: CPT | Mod: TC,LT

## 2021-07-15 PROCEDURE — 1126F PR PAIN SEVERITY QUANTIFIED, NO PAIN PRESENT: ICD-10-PCS | Mod: S$GLB,,, | Performed by: PHYSICIAN ASSISTANT

## 2021-07-20 ENCOUNTER — CLINICAL SUPPORT (OUTPATIENT)
Dept: REHABILITATION | Facility: HOSPITAL | Age: 21
End: 2021-07-20
Payer: COMMERCIAL

## 2021-07-20 DIAGNOSIS — M25.562 ACUTE PAIN OF LEFT KNEE: ICD-10-CM

## 2021-07-20 DIAGNOSIS — M25.662 DECREASED RANGE OF MOTION OF LEFT KNEE: ICD-10-CM

## 2021-07-20 PROCEDURE — 97110 THERAPEUTIC EXERCISES: CPT

## 2021-07-23 ENCOUNTER — CLINICAL SUPPORT (OUTPATIENT)
Dept: REHABILITATION | Facility: HOSPITAL | Age: 21
End: 2021-07-23
Payer: COMMERCIAL

## 2021-07-23 DIAGNOSIS — M25.562 ACUTE PAIN OF LEFT KNEE: ICD-10-CM

## 2021-07-23 DIAGNOSIS — M25.662 DECREASED RANGE OF MOTION OF LEFT KNEE: ICD-10-CM

## 2021-07-23 PROCEDURE — 97140 MANUAL THERAPY 1/> REGIONS: CPT | Performed by: PHYSICAL THERAPIST

## 2021-07-23 PROCEDURE — 97110 THERAPEUTIC EXERCISES: CPT | Performed by: PHYSICAL THERAPIST

## 2021-07-29 ENCOUNTER — CLINICAL SUPPORT (OUTPATIENT)
Dept: REHABILITATION | Facility: HOSPITAL | Age: 21
End: 2021-07-29
Payer: COMMERCIAL

## 2021-07-29 DIAGNOSIS — M25.562 ACUTE PAIN OF LEFT KNEE: ICD-10-CM

## 2021-07-29 DIAGNOSIS — M25.662 DECREASED RANGE OF MOTION OF LEFT KNEE: ICD-10-CM

## 2021-07-29 PROCEDURE — 97110 THERAPEUTIC EXERCISES: CPT | Performed by: PHYSICAL THERAPIST

## 2021-08-04 ENCOUNTER — CLINICAL SUPPORT (OUTPATIENT)
Dept: REHABILITATION | Facility: HOSPITAL | Age: 21
End: 2021-08-04
Payer: COMMERCIAL

## 2021-08-04 DIAGNOSIS — M25.562 ACUTE PAIN OF LEFT KNEE: ICD-10-CM

## 2021-08-04 DIAGNOSIS — M25.662 DECREASED RANGE OF MOTION OF LEFT KNEE: ICD-10-CM

## 2021-08-04 PROCEDURE — 97110 THERAPEUTIC EXERCISES: CPT | Performed by: PHYSICAL THERAPIST

## 2021-08-06 ENCOUNTER — CLINICAL SUPPORT (OUTPATIENT)
Dept: REHABILITATION | Facility: HOSPITAL | Age: 21
End: 2021-08-06
Payer: COMMERCIAL

## 2021-08-06 ENCOUNTER — PATIENT MESSAGE (OUTPATIENT)
Dept: ORTHOPEDICS | Facility: CLINIC | Age: 21
End: 2021-08-06

## 2021-08-06 DIAGNOSIS — M25.562 ACUTE PAIN OF LEFT KNEE: ICD-10-CM

## 2021-08-06 DIAGNOSIS — M25.662 DECREASED RANGE OF MOTION OF LEFT KNEE: ICD-10-CM

## 2021-08-06 PROCEDURE — 97110 THERAPEUTIC EXERCISES: CPT

## 2021-08-11 ENCOUNTER — CLINICAL SUPPORT (OUTPATIENT)
Dept: REHABILITATION | Facility: HOSPITAL | Age: 21
End: 2021-08-11
Payer: COMMERCIAL

## 2021-08-11 DIAGNOSIS — M25.562 ACUTE PAIN OF LEFT KNEE: ICD-10-CM

## 2021-08-11 DIAGNOSIS — M25.562 ACUTE PAIN OF LEFT KNEE: Primary | ICD-10-CM

## 2021-08-11 DIAGNOSIS — M25.662 DECREASED RANGE OF MOTION OF LEFT KNEE: ICD-10-CM

## 2021-08-11 PROCEDURE — 97110 THERAPEUTIC EXERCISES: CPT | Performed by: PHYSICAL THERAPIST

## 2021-08-12 ENCOUNTER — HOSPITAL ENCOUNTER (OUTPATIENT)
Dept: RADIOLOGY | Facility: HOSPITAL | Age: 21
Discharge: HOME | End: 2021-08-12
Attending: ORTHOPAEDIC SURGERY
Payer: COMMERCIAL

## 2021-08-12 ENCOUNTER — OFFICE VISIT (OUTPATIENT)
Dept: ORTHOPEDICS | Facility: CLINIC | Age: 21
End: 2021-08-12
Payer: COMMERCIAL

## 2021-08-12 VITALS — HEIGHT: 73 IN | WEIGHT: 188 LBS | BODY MASS INDEX: 24.92 KG/M2

## 2021-08-12 DIAGNOSIS — M25.562 ACUTE PAIN OF LEFT KNEE: ICD-10-CM

## 2021-08-12 DIAGNOSIS — Z98.890 POST-OPERATIVE STATE: Primary | ICD-10-CM

## 2021-08-12 DIAGNOSIS — M23.52 RECURRENT LEFT KNEE INSTABILITY: ICD-10-CM

## 2021-08-12 PROCEDURE — 1159F MED LIST DOCD IN RCRD: CPT | Mod: CPTII,S$GLB,, | Performed by: ORTHOPAEDIC SURGERY

## 2021-08-12 PROCEDURE — 1159F PR MEDICATION LIST DOCUMENTED IN MEDICAL RECORD: ICD-10-PCS | Mod: CPTII,S$GLB,, | Performed by: ORTHOPAEDIC SURGERY

## 2021-08-12 PROCEDURE — 1126F PR PAIN SEVERITY QUANTIFIED, NO PAIN PRESENT: ICD-10-PCS | Mod: CPTII,S$GLB,, | Performed by: ORTHOPAEDIC SURGERY

## 2021-08-12 PROCEDURE — 3008F BODY MASS INDEX DOCD: CPT | Mod: CPTII,S$GLB,, | Performed by: ORTHOPAEDIC SURGERY

## 2021-08-12 PROCEDURE — 99999 PR PBB SHADOW E&M-EST. PATIENT-LVL II: CPT | Mod: PBBFAC,,, | Performed by: ORTHOPAEDIC SURGERY

## 2021-08-12 PROCEDURE — 3008F PR BODY MASS INDEX (BMI) DOCUMENTED: ICD-10-PCS | Mod: CPTII,S$GLB,, | Performed by: ORTHOPAEDIC SURGERY

## 2021-08-12 PROCEDURE — 99999 PR PBB SHADOW E&M-EST. PATIENT-LVL II: ICD-10-PCS | Mod: PBBFAC,,, | Performed by: ORTHOPAEDIC SURGERY

## 2021-08-12 PROCEDURE — 99024 POSTOP FOLLOW-UP VISIT: CPT | Mod: S$GLB,,, | Performed by: ORTHOPAEDIC SURGERY

## 2021-08-12 PROCEDURE — 1126F AMNT PAIN NOTED NONE PRSNT: CPT | Mod: CPTII,S$GLB,, | Performed by: ORTHOPAEDIC SURGERY

## 2021-08-12 PROCEDURE — 99024 PR POST-OP FOLLOW-UP VISIT: ICD-10-PCS | Mod: S$GLB,,, | Performed by: ORTHOPAEDIC SURGERY

## 2021-10-01 ENCOUNTER — TELEPHONE (OUTPATIENT)
Dept: ORTHOPEDICS | Facility: CLINIC | Age: 21
End: 2021-10-01

## 2021-10-22 ENCOUNTER — PATIENT MESSAGE (OUTPATIENT)
Dept: ORTHOPEDICS | Facility: CLINIC | Age: 21
End: 2021-10-22
Payer: COMMERCIAL

## 2021-11-01 ENCOUNTER — OFFICE VISIT (OUTPATIENT)
Dept: ORTHOPEDICS | Facility: CLINIC | Age: 21
End: 2021-11-01
Payer: COMMERCIAL

## 2021-11-01 VITALS — WEIGHT: 188 LBS | HEIGHT: 73 IN | BODY MASS INDEX: 24.92 KG/M2

## 2021-11-01 DIAGNOSIS — M23.52 RECURRENT LEFT KNEE INSTABILITY: Primary | ICD-10-CM

## 2021-11-01 PROCEDURE — 99999 PR PBB SHADOW E&M-EST. PATIENT-LVL II: CPT | Mod: PBBFAC,,, | Performed by: ORTHOPAEDIC SURGERY

## 2021-11-01 PROCEDURE — 99213 PR OFFICE/OUTPT VISIT, EST, LEVL III, 20-29 MIN: ICD-10-PCS | Mod: S$GLB,,, | Performed by: ORTHOPAEDIC SURGERY

## 2021-11-01 PROCEDURE — 99213 OFFICE O/P EST LOW 20 MIN: CPT | Mod: S$GLB,,, | Performed by: ORTHOPAEDIC SURGERY

## 2021-11-01 PROCEDURE — 99999 PR PBB SHADOW E&M-EST. PATIENT-LVL II: ICD-10-PCS | Mod: PBBFAC,,, | Performed by: ORTHOPAEDIC SURGERY

## 2021-11-15 ENCOUNTER — OFFICE VISIT (OUTPATIENT)
Dept: SPORTS MEDICINE | Facility: CLINIC | Age: 21
End: 2021-11-15
Payer: COMMERCIAL

## 2021-11-15 DIAGNOSIS — M23.52 RECURRENT LEFT KNEE INSTABILITY: Primary | ICD-10-CM

## 2021-11-15 DIAGNOSIS — M25.662 DECREASED RANGE OF MOTION OF LEFT KNEE: ICD-10-CM

## 2021-11-15 PROCEDURE — 99999 PR PBB SHADOW E&M-EST. PATIENT-LVL I: CPT | Mod: PBBFAC,,, | Performed by: STUDENT IN AN ORGANIZED HEALTH CARE EDUCATION/TRAINING PROGRAM

## 2021-11-15 PROCEDURE — 99213 PR OFFICE/OUTPT VISIT, EST, LEVL III, 20-29 MIN: ICD-10-PCS | Mod: S$GLB,,, | Performed by: STUDENT IN AN ORGANIZED HEALTH CARE EDUCATION/TRAINING PROGRAM

## 2021-11-15 PROCEDURE — 1159F MED LIST DOCD IN RCRD: CPT | Mod: CPTII,S$GLB,, | Performed by: STUDENT IN AN ORGANIZED HEALTH CARE EDUCATION/TRAINING PROGRAM

## 2021-11-15 PROCEDURE — 1159F PR MEDICATION LIST DOCUMENTED IN MEDICAL RECORD: ICD-10-PCS | Mod: CPTII,S$GLB,, | Performed by: STUDENT IN AN ORGANIZED HEALTH CARE EDUCATION/TRAINING PROGRAM

## 2021-11-15 PROCEDURE — 99999 PR PBB SHADOW E&M-EST. PATIENT-LVL I: ICD-10-PCS | Mod: PBBFAC,,, | Performed by: STUDENT IN AN ORGANIZED HEALTH CARE EDUCATION/TRAINING PROGRAM

## 2021-11-15 PROCEDURE — 99213 OFFICE O/P EST LOW 20 MIN: CPT | Mod: S$GLB,,, | Performed by: STUDENT IN AN ORGANIZED HEALTH CARE EDUCATION/TRAINING PROGRAM

## 2021-12-01 DIAGNOSIS — M25.562 LEFT KNEE PAIN, UNSPECIFIED CHRONICITY: Primary | ICD-10-CM

## 2021-12-02 ENCOUNTER — TELEPHONE (OUTPATIENT)
Dept: ORTHOPEDICS | Facility: CLINIC | Age: 21
End: 2021-12-02
Payer: COMMERCIAL

## 2021-12-13 ENCOUNTER — PATIENT MESSAGE (OUTPATIENT)
Dept: ORTHOPEDICS | Facility: CLINIC | Age: 21
End: 2021-12-13
Payer: COMMERCIAL

## 2022-01-18 ENCOUNTER — PATIENT MESSAGE (OUTPATIENT)
Dept: ORTHOPEDICS | Facility: CLINIC | Age: 22
End: 2022-01-18
Payer: COMMERCIAL

## 2022-01-18 ENCOUNTER — TELEPHONE (OUTPATIENT)
Dept: ORTHOPEDICS | Facility: CLINIC | Age: 22
End: 2022-01-18
Payer: COMMERCIAL

## 2022-01-18 NOTE — TELEPHONE ENCOUNTER
Left vm regarding patient's ortho referral and scheduling apt. Left call back phone number to call back to schedule an appointment.     giovanna shabazzg sent regarding ortho referral

## 2022-01-26 ENCOUNTER — TELEPHONE (OUTPATIENT)
Dept: ORTHOPEDICS | Facility: CLINIC | Age: 22
End: 2022-01-26
Payer: COMMERCIAL

## 2022-01-27 ENCOUNTER — OFFICE VISIT (OUTPATIENT)
Dept: ORTHOPEDICS | Facility: CLINIC | Age: 22
End: 2022-01-27
Payer: COMMERCIAL

## 2022-01-27 ENCOUNTER — HOSPITAL ENCOUNTER (OUTPATIENT)
Dept: RADIOLOGY | Facility: HOSPITAL | Age: 22
Discharge: HOME | End: 2022-01-27
Attending: ORTHOPAEDIC SURGERY
Payer: COMMERCIAL

## 2022-01-27 VITALS — HEIGHT: 73 IN | WEIGHT: 188 LBS | BODY MASS INDEX: 24.92 KG/M2

## 2022-01-27 DIAGNOSIS — M23.52 RECURRENT LEFT KNEE INSTABILITY: Primary | ICD-10-CM

## 2022-01-27 DIAGNOSIS — M23.52 RECURRENT LEFT KNEE INSTABILITY: ICD-10-CM

## 2022-01-27 PROCEDURE — 99213 PR OFFICE/OUTPT VISIT, EST, LEVL III, 20-29 MIN: ICD-10-PCS | Mod: S$GLB,,, | Performed by: ORTHOPAEDIC SURGERY

## 2022-01-27 PROCEDURE — 73564 XR KNEE ORTHO LEFT WITH FLEXION: ICD-10-PCS | Mod: 26,LT,, | Performed by: RADIOLOGY

## 2022-01-27 PROCEDURE — 1159F MED LIST DOCD IN RCRD: CPT | Mod: CPTII,S$GLB,, | Performed by: ORTHOPAEDIC SURGERY

## 2022-01-27 PROCEDURE — 3008F PR BODY MASS INDEX (BMI) DOCUMENTED: ICD-10-PCS | Mod: CPTII,S$GLB,, | Performed by: ORTHOPAEDIC SURGERY

## 2022-01-27 PROCEDURE — 73562 X-RAY EXAM OF KNEE 3: CPT | Mod: TC,RT

## 2022-01-27 PROCEDURE — 99999 PR PBB SHADOW E&M-EST. PATIENT-LVL III: ICD-10-PCS | Mod: PBBFAC,,, | Performed by: ORTHOPAEDIC SURGERY

## 2022-01-27 PROCEDURE — 99999 PR PBB SHADOW E&M-EST. PATIENT-LVL III: CPT | Mod: PBBFAC,,, | Performed by: ORTHOPAEDIC SURGERY

## 2022-01-27 PROCEDURE — 99213 OFFICE O/P EST LOW 20 MIN: CPT | Mod: S$GLB,,, | Performed by: ORTHOPAEDIC SURGERY

## 2022-01-27 PROCEDURE — 73562 X-RAY EXAM OF KNEE 3: CPT | Mod: 26,RT,, | Performed by: RADIOLOGY

## 2022-01-27 PROCEDURE — 73564 X-RAY EXAM KNEE 4 OR MORE: CPT | Mod: 26,LT,, | Performed by: RADIOLOGY

## 2022-01-27 PROCEDURE — 1159F PR MEDICATION LIST DOCUMENTED IN MEDICAL RECORD: ICD-10-PCS | Mod: CPTII,S$GLB,, | Performed by: ORTHOPAEDIC SURGERY

## 2022-01-27 PROCEDURE — 3008F BODY MASS INDEX DOCD: CPT | Mod: CPTII,S$GLB,, | Performed by: ORTHOPAEDIC SURGERY

## 2022-01-27 PROCEDURE — 73562 XR KNEE ORTHO LEFT WITH FLEXION: ICD-10-PCS | Mod: 26,RT,, | Performed by: RADIOLOGY

## 2022-01-27 NOTE — PROGRESS NOTES
Patient ID: Gurpreet Jurado  YOB: 2000  MRN: [id number]    Chief Complaint: Pain of the Left Knee    Referred By: Dr. Phan and Darinel    History of Present Illness: Gurpreet Jurado is a 21 y.o. male [employer] [facility] Student Athlete [employer]- [employer] with a chief complaint of Pain of the Left Knee    Patient presents to the clinic today for a follow-up on his Left knee 7 months s/p Open Proximal Tib-Fib joint Reconstruction (7/2/2021). He rates his pain as 0/10 and denies any numbness or swelling. He no longer experiences any soreness with running and states that his knee does not give him any problems. 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    Previous History of Present Illness (11/1/2021):   4 months s/p left knee open proximal tibio-fibular joint reconstruction 7/2/21.  Doing well in PT with Darinel at [facility].  Baseball season starts in February.  He is doing agility drills and other functional activities but no issues or pain.    Previous Plan:  · Continue functional training  · Resume baseball activities as tolerated      Past Medical History:   Past Medical History:   Diagnosis Date    Sickle cell trait      Past Surgical History:   Procedure Laterality Date    ARTHROSCOPY OF KNEE Left 7/2/2021    Procedure: ARTHROSCOPY, KNEE;  Surgeon: Montana Luke MD;  Location: [facility];  Service: Orthopedics;  Laterality: Left;  · Left knee peroneal nerve neurolysis    CHONDROPLASTY OF KNEE Left 7/2/2021    Procedure: CHONDROPLASTY, KNEE;  Surgeon: Montana Luke MD;  Location: [facility] OR;  Service: Orthopedics;  Laterality: Left;  Left knee chondroplasty of lateral tibial plateau    KNEE ARTHROSCOPY W/ MENISCECTOMY Left 7/2/2021    Procedure: ARTHROSCOPY, KNEE, WITH MENISCECTOMY;  Surgeon: Montana Luke MD;  Location: [facility];  Service: Orthopedics;  Laterality: Left;  Left knee partial lateral meniscectomy approximately 10-15% of body anterior  horn    left knee   2018     No family history on file.  Social History     Socioeconomic History    Marital status: Single   Tobacco Use    Smoking status: Never Smoker    Smokeless tobacco: Never Used   Substance and Sexual Activity    Alcohol use: Never    Drug use: Never    Sexual activity: Yes     Partners: Female     Medication List with Changes/Refills   Current Medications    ASPIRIN (ECOTRIN) 325 MG EC TABLET    Take 1 tablet (325 mg total) by mouth once daily.    OXYCODONE-ACETAMINOPHEN (PERCOCET) 5-325 MG PER TABLET    Take 1 tablet by mouth every 6 (six) hours as needed for Pain.   Review of patient's allergies indicates:  No Known Allergies    Physical Exam:   There is no height or weight on file to calculate BMI.  GENERAL: Well appearing, appropriate for stated age, no acute distress.  CARDIOVASCULAR: Pulses regular by peripheral palpation.  PULMONARY: Respirations are even and non-labored.  NEURO: Awake, alert, and oriented x 3.  PSYCH: Mood & affect are appropriate.  HEENT: Head is normocephalic and atraumatic.    Ortho/SPM Exam  {RIGHT/LEFT:13536} Knee:  Inspection:   ***  Range of motion:  *** deg extension - *** deg flexion  Strength:   ***/5 Extension     ***/5 Flexion  Palpation tenderness: ***  Special Tests:  ***  Neurovascular: Intact EHL, FHL, gastrocsoleus, and tibialis anterior. Sensation intact to light touch in superficial peroneal, deep peroneal, tibial, sural, and saphenous nerve distributions. Foot warm and well perfused with capillary refill of less than 2 seconds and palpable pedal pulses.     Imaging:   XR Results:  Results for orders placed during the hospital encounter of 07/15/21    X-Ray Knee 1 or 2 View Left    Narrative  EXAMINATION:  XR KNEE 1 OR 2 VIEW LEFT    CLINICAL HISTORY:  Chronic instability of knee, left knee    COMPARISON:  April 22, 2021    FINDINGS:  Bipartite patella again noted.  No effusion.  Medial and lateral compartments well maintained.   Postsurgical changes consistent with proximal tibiofibular joint reconstruction noted.  Patella appears high riding on the extended lateral view..    Impression  Postsurgical changes consistent with proximal tibiofibular joint reconstruction.    Remainder of the exam stable.  See above.      Electronically signed by: Randy Navas MD  Date:    07/15/2021  Time:    18:20    MRI Results:  Results for orders placed during the hospital encounter of 04/28/21    MRI Knee Without Contrast Left    Addendum 4/28/2021  9:51 AM  There is mild increased signal noted within the anterior tibiofibular ligament suggesting sprain without definite evidence of complete disruption.  Posterior tibiofibular ligament appears intact.      Electronically signed by: Aguila Talavera MD  Date:    04/28/2021  Time:    09:49    Narrative  EXAMINATION:  MRI KNEE WITHOUT CONTRAST LEFT    CLINICAL HISTORY:  Knee instability;left knee instability;Chronic instability of knee, unspecified knee    TECHNIQUE:  Multiplanar, multisequence images were performed about the knee.    COMPARISON:  None    FINDINGS:  Menisci:    --Medial: Intact    --Lateral: Tiny radial tear versus prior surgery noted within the posterior body-horn junction and also noted within the anterior body segment.  Anterior horn intrasubstance signal present with suspected inferior articular extension.    Ligaments:  ACL, PCL, MCL, and LCL complex are intact.    Tendons:  Quadriceps and patellar tendons are intact.  Medial and lateral retinacula intact.  Mild lateral patellar tilt noted.    Cartilage:    Patellofemoral: Articular cartilage is maintained.    Medial tibiofemoral: Articular cartilage is maintained.    Lateral tibiofemoral: Articular cartilage is maintained.    Bone: Bipartite patella present with edema involving the bipartite segments.  Patellar edema also noted involving the medial aspect.  Small focus of osseous edema noted underlying the posterior tibial spines and  within the anteromedial aspect of the medial tibial plateau    Miscellaneous: Tiny suprapatellar joint effusion.  Edema signal noted within the superolateral aspect of Hoffa's fat pad suggestive of patellar tendon-lateral femoral condyle friction syndrome.    Impression  1. 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  Mild edema also noted within the inferior medial patella.  No acute fracture appreciated.  2. Mild lateral patellar tilt with findings suggestive of patellar tendon-lateral femoral condyle friction syndrome.  3. Lateral meniscus radial tear versus prior surgery changes as above.  Lateral meniscus anterior horn intrasubstance signal abnormality noted with inferior surface extension.  Tear versus postop changes.  4. Small areas of osseous contusion within the medial tibial plateau and deep to the tibial spines.      Electronically signed by: Aguila Talavera MD  Date:    04/28/2021  Time:    09:22    CT Results:  No results found for this or any previous visit.    Relevant imaging results reviewed and interpreted by me, discussed with the patient and / or family today. ***    Other Tests:   No other tests performed today.***    There are no Patient Instructions on file for this visit.  Provider Note/Medical Decision Making: ***     I discussed worrisome and red flag signs and symptoms with the patient. The patient expressed understanding and agreed to alert me immediately or to go to the emergency room if they experience any of these.    Treatment plan was developed with input from the patient/family, and they expressed understanding and agreement with the plan. All questions were answered today.    Disclaimer: This note was prepared using a voice recognition system and is likely to have sound alike errors within the text.

## 2022-01-27 NOTE — PROGRESS NOTES
Patient ID: Gurpreet Jurado  YOB: 2000  MRN: [id number]    Chief Complaint: Pain of the Left Knee    Referred By: Dr. Phan and Darinel    History of Present Illness: Gurpreet Jurado is a 21 y.o. male [employer] [facility] Student Athlete Baseball- [employer] with a chief complaint of Pain of the Left Knee    Patient presents to the clinic today for a follow-up on his Left knee 7 months s/p Open Proximal Tib-Fib joint Reconstruction (7/2/2021). He had a few locking episodes back in November but has not had any recently.  He rates his pain as 0/10 and denies any numbness or swelling. He no longer experiences any soreness with running and states that his knee does not give him any problems. 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    Previous History of Present Illness (11/1/2021):   4 months s/p left knee open proximal tibio-fibular joint reconstruction 7/2/21.  Doing well in PT with Darinel at [facility].  Baseball season starts in February.  He is doing agility drills and other functional activities but no issues or pain.    Previous Plan:  · Continue functional training  · Resume baseball activities as tolerated      Past Medical History:   Past Medical History:   Diagnosis Date    Sickle cell trait      Past Surgical History:   Procedure Laterality Date    ARTHROSCOPY OF KNEE Left 7/2/2021    Procedure: ARTHROSCOPY, KNEE;  Surgeon: Montana Luke MD;  Location: [facility];  Service: Orthopedics;  Laterality: Left;  · Left knee peroneal nerve neurolysis    CHONDROPLASTY OF KNEE Left 7/2/2021    Procedure: CHONDROPLASTY, KNEE;  Surgeon: Montana Luke MD;  Location: [facility] OR;  Service: Orthopedics;  Laterality: Left;  Left knee chondroplasty of lateral tibial plateau    KNEE ARTHROSCOPY W/ MENISCECTOMY Left 7/2/2021    Procedure: ARTHROSCOPY, KNEE, WITH MENISCECTOMY;  Surgeon: Montana Luke MD;  Location: [facility];  Service: Orthopedics;  Laterality: Left;   Left knee partial lateral meniscectomy approximately 10-15% of body anterior horn    left knee   2018     History reviewed. No pertinent family history.  Social History     Socioeconomic History    Marital status: Single   Tobacco Use    Smoking status: Never Smoker    Smokeless tobacco: Never Used   Substance and Sexual Activity    Alcohol use: Never    Drug use: Never    Sexual activity: Yes     Partners: Female     Medication List with Changes/Refills   Current Medications    ASPIRIN (ECOTRIN) 325 MG EC TABLET    Take 1 tablet (325 mg total) by mouth once daily.    OXYCODONE-ACETAMINOPHEN (PERCOCET) 5-325 MG PER TABLET    Take 1 tablet by mouth every 6 (six) hours as needed for Pain.   Review of patient's allergies indicates:  No Known Allergies    Physical Exam:   Body mass index is 24.8 kg/m².  GENERAL: Well appearing, appropriate for stated age, no acute distress.  CARDIOVASCULAR: Pulses regular by peripheral palpation.  PULMONARY: Respirations are even and non-labored.  NEURO: Awake, alert, and oriented x 3.  PSYCH: Mood & affect are appropriate.  HEENT: Head is normocephalic and atraumatic.    Ortho/SPM Exam  Left Knee:  Inspection:   No effusion or deformity  Range of motion:  Full ROM  Strength:   5/5 Extension     5/5 Flexion  Palpation tenderness: Lateral patellar facet  Special Tests:  Normal tib-fib joint stability, equal bilaterally  Neurovascular: Intact EHL, FHL, gastrocsoleus, and tibialis anterior. Sensation intact to light touch in superficial peroneal, deep peroneal, tibial, sural, and saphenous nerve distributions. Foot warm and well perfused with capillary refill of less than 2 seconds and palpable pedal pulses.     Imaging:   XR Results:  Results for orders placed during the hospital encounter of 07/15/21    X-Ray Knee 1 or 2 View Left    Narrative  EXAMINATION:  XR KNEE 1 OR 2 VIEW LEFT    CLINICAL HISTORY:  Chronic instability of knee, left knee    COMPARISON:  April 22,  2021    FINDINGS:  Bipartite patella again noted.  No effusion.  Medial and lateral compartments well maintained.  Postsurgical changes consistent with proximal tibiofibular joint reconstruction noted.  Patella appears high riding on the extended lateral view..    Impression  Postsurgical changes consistent with proximal tibiofibular joint reconstruction.    Remainder of the exam stable.  See above.      Electronically signed by: Randy Navas MD  Date:    07/15/2021  Time:    18:20    MRI Results:  Results for orders placed during the hospital encounter of 04/28/21    MRI Knee Without Contrast Left    Addendum 4/28/2021  9:51 AM  There is mild increased signal noted within the anterior tibiofibular ligament suggesting sprain without definite evidence of complete disruption.  Posterior tibiofibular ligament appears intact.      Electronically signed by: Aguila Talavera MD  Date:    04/28/2021  Time:    09:49    Narrative  EXAMINATION:  MRI KNEE WITHOUT CONTRAST LEFT    CLINICAL HISTORY:  Knee instability;left knee instability;Chronic instability of knee, unspecified knee    TECHNIQUE:  Multiplanar, multisequence images were performed about the knee.    COMPARISON:  None    FINDINGS:  Menisci:    --Medial: Intact    --Lateral: Tiny radial tear versus prior surgery noted within the posterior body-horn junction and also noted within the anterior body segment.  Anterior horn intrasubstance signal present with suspected inferior articular extension.    Ligaments:  ACL, PCL, MCL, and LCL complex are intact.    Tendons:  Quadriceps and patellar tendons are intact.  Medial and lateral retinacula intact.  Mild lateral patellar tilt noted.    Cartilage:    Patellofemoral: Articular cartilage is maintained.    Medial tibiofemoral: Articular cartilage is maintained.    Lateral tibiofemoral: Articular cartilage is maintained.    Bone: Bipartite patella present with edema involving the bipartite segments.  Patellar edema also  noted involving the medial aspect.  Small focus of osseous edema noted underlying the posterior tibial spines and within the anteromedial aspect of the medial tibial plateau    Miscellaneous: Tiny suprapatellar joint effusion.  Edema signal noted within the superolateral aspect of Hoffa's fat pad suggestive of patellar tendon-lateral femoral condyle friction syndrome.    Impression  1. 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  Mild edema also noted within the inferior medial patella.  No acute fracture appreciated.  2. Mild lateral patellar tilt with findings suggestive of patellar tendon-lateral femoral condyle friction syndrome.  3. Lateral meniscus radial tear versus prior surgery changes as above.  Lateral meniscus anterior horn intrasubstance signal abnormality noted with inferior surface extension.  Tear versus postop changes.  4. Small areas of osseous contusion within the medial tibial plateau and deep to the tibial spines.      Electronically signed by: Aguila Talavera MD  Date:    04/28/2021  Time:    09:22    CT Results:  No results found for this or any previous visit.    Relevant imaging results reviewed and interpreted by me, discussed with the patient and / or family today. I agree with findings stated above.       Other Tests:   No other tests performed today.    Patient Instructions     Assessment:  Gurpreet Jurado is a 21 y.o. male [employer] [facility] [employer] Student Athlete Baseball- [employer] with a chief complaint of Pain of the Left Knee     7 months S/P open tib/fib joint reconstruction 7/2/21    Encounter Diagnosis   Name Primary?   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   However we do not see anything that would keep him out of sports although he may have to modify his reps and rest time based on some of these other baseline knee issues    Follow-up: PRN or sooner if there are any problems between now and then.    Thank you for choosing [facility] and Dr. Montana Luke for your orthopedic & sports medicine care. It is our goal to provide you with exceptional care that will help keep you healthy, active, and get you back in the game.    If you felt that you received exemplary care today, please consider leaving us feedback on [employer] at [email or web address].     Please do not hesitate to reach out to us via email, phone, or MyChart with any questions, concerns, or feedback.    If you are experiencing pain/discomfort ,or have questions after 5pm and would like to be connected to the [facility] on-call team, please call this number and specify which Sports Medicine provider is treating you: [phone]        Provider Note/Medical Decision Making:      I discussed worrisome and red flag signs and symptoms with the patient. The patient expressed understanding and agreed to alert me immediately or to go to the emergency room if they experience any of these.    Treatment plan was developed with input from the patient/family, and they expressed understanding and agreement with the plan. All questions were answered today.    Disclaimer: This note was prepared using a voice recognition system and is likely to have sound alike errors within the text.     I,  Pako Barragan, acted as a scribe for Montana Luke MD for the duration of this office visit.

## 2022-01-27 NOTE — PATIENT INSTRUCTIONS
Assessment:  Gurpreet Jurado is a 21 y.o. male [employer] Student Athlete Baseball- [employer] with a chief complaint of Pain of the Left Knee     7 months S/P open tib/fib joint reconstruction 7/2/21    Encounter Diagnosis   Name Primary?    Recurrent left knee instability Yes      Plan:   May return to athletics as tolerated   On radiographs and physical examination his tib-fib joint reconstruction is stable   He does have reasons for continued knee pain and stiffness including history of prior lateral compartment meniscus surgery, a baked bipartite patella with patellofemoral arthritis crepitus and slight J-sign, as well as a preoperative flexion contracture that limits his range of motion   We discussed that is unlikely that he would have instability with his tib-fib joint at this point but he does have several reasons for continued knee pain especially after extreme activity.   However we do not see anything that would keep him out of sports although he may have to modify his reps and rest time based on some of these other baseline knee issues    Follow-up: PRN or sooner if there are any problems between now and then.    Thank you for choosing [facility] and Dr. Montana Luke for your orthopedic & sports medicine care. It is our goal to provide you with exceptional care that will help keep you healthy, active, and get you back in the game.    If you felt that you received exemplary care today, please consider leaving us feedback on [employer] at [email or web address].     Please do not hesitate to reach out to us via email, phone, or MyChart with any questions, concerns, or feedback.    If you are experiencing pain/discomfort ,or have questions after 5pm and would like to be connected to the [facility] on-call team, please call this number and specify which Sports Medicine provider is  treating you: [phone]

## 2022-09-16 ENCOUNTER — HOSPITAL ENCOUNTER (OUTPATIENT)
Dept: RADIOLOGY | Facility: HOSPITAL | Age: 22
Discharge: HOME | End: 2022-09-16
Attending: ORTHOPAEDIC SURGERY
Payer: COMMERCIAL

## 2022-09-16 ENCOUNTER — OFFICE VISIT (OUTPATIENT)
Dept: ORTHOPEDICS | Facility: CLINIC | Age: 22
End: 2022-09-16
Payer: COMMERCIAL

## 2022-09-16 VITALS — BODY MASS INDEX: 24.92 KG/M2 | HEIGHT: 73 IN | WEIGHT: 188 LBS

## 2022-09-16 DIAGNOSIS — M23.8X2 SNAPPING KNEE, LEFT: Primary | ICD-10-CM

## 2022-09-16 DIAGNOSIS — M25.562 LEFT KNEE PAIN, UNSPECIFIED CHRONICITY: ICD-10-CM

## 2022-09-16 PROCEDURE — 1159F PR MEDICATION LIST DOCUMENTED IN MEDICAL RECORD: ICD-10-PCS | Mod: CPTII,S$GLB,, | Performed by: STUDENT IN AN ORGANIZED HEALTH CARE EDUCATION/TRAINING PROGRAM

## 2022-09-16 PROCEDURE — 3008F BODY MASS INDEX DOCD: CPT | Mod: CPTII,S$GLB,, | Performed by: STUDENT IN AN ORGANIZED HEALTH CARE EDUCATION/TRAINING PROGRAM

## 2022-09-16 PROCEDURE — 99999 PR PBB SHADOW E&M-EST. PATIENT-LVL III: CPT | Mod: PBBFAC,,, | Performed by: STUDENT IN AN ORGANIZED HEALTH CARE EDUCATION/TRAINING PROGRAM

## 2022-09-16 PROCEDURE — 73564 X-RAY EXAM KNEE 4 OR MORE: CPT | Mod: 26,LT,, | Performed by: RADIOLOGY

## 2022-09-16 PROCEDURE — 1160F PR REVIEW ALL MEDS BY PRESCRIBER/CLIN PHARMACIST DOCUMENTED: ICD-10-PCS | Mod: CPTII,S$GLB,, | Performed by: STUDENT IN AN ORGANIZED HEALTH CARE EDUCATION/TRAINING PROGRAM

## 2022-09-16 PROCEDURE — 1159F MED LIST DOCD IN RCRD: CPT | Mod: CPTII,S$GLB,, | Performed by: STUDENT IN AN ORGANIZED HEALTH CARE EDUCATION/TRAINING PROGRAM

## 2022-09-16 PROCEDURE — 73562 XR KNEE ORTHO LEFT WITH FLEXION: ICD-10-PCS | Mod: 26,RT,, | Performed by: RADIOLOGY

## 2022-09-16 PROCEDURE — 73564 XR KNEE ORTHO LEFT WITH FLEXION: ICD-10-PCS | Mod: 26,LT,, | Performed by: RADIOLOGY

## 2022-09-16 PROCEDURE — 3008F PR BODY MASS INDEX (BMI) DOCUMENTED: ICD-10-PCS | Mod: CPTII,S$GLB,, | Performed by: STUDENT IN AN ORGANIZED HEALTH CARE EDUCATION/TRAINING PROGRAM

## 2022-09-16 PROCEDURE — 99999 PR PBB SHADOW E&M-EST. PATIENT-LVL III: ICD-10-PCS | Mod: PBBFAC,,, | Performed by: STUDENT IN AN ORGANIZED HEALTH CARE EDUCATION/TRAINING PROGRAM

## 2022-09-16 PROCEDURE — 99213 PR OFFICE/OUTPT VISIT, EST, LEVL III, 20-29 MIN: ICD-10-PCS | Mod: S$GLB,,, | Performed by: STUDENT IN AN ORGANIZED HEALTH CARE EDUCATION/TRAINING PROGRAM

## 2022-09-16 PROCEDURE — 73562 X-RAY EXAM OF KNEE 3: CPT | Mod: 26,RT,, | Performed by: RADIOLOGY

## 2022-09-16 PROCEDURE — 73562 X-RAY EXAM OF KNEE 3: CPT | Mod: TC,RT

## 2022-09-16 PROCEDURE — 99213 OFFICE O/P EST LOW 20 MIN: CPT | Mod: S$GLB,,, | Performed by: STUDENT IN AN ORGANIZED HEALTH CARE EDUCATION/TRAINING PROGRAM

## 2022-09-16 PROCEDURE — 1160F RVW MEDS BY RX/DR IN RCRD: CPT | Mod: CPTII,S$GLB,, | Performed by: STUDENT IN AN ORGANIZED HEALTH CARE EDUCATION/TRAINING PROGRAM

## 2022-09-16 NOTE — PROGRESS NOTES
Orthopaedic Follow-Up Visit    Last Appointment: 1/27/22  Diagnosis: left knee tibiofibular instability  Prior Procedure: left knee tibiofibular reconstruction (7/2021)    Gurpreet Jurado is a 21 y.o. male who is here for f/u evaluation of left knee lateral snapping and pain. The patient was last seen here by Dr. Luke on 01/27/2022 at which point we decided to send him return to sports as tolerated. The patient returns today reporting that the symptoms of painful popping in the knee happen once every few weeks.  He reports it is sore for maybe a day, but he is able to continue participating in baseball in spite of it.     To review his history, this is a 21-year-old male,  at [employer], who is 1 year out from proximal tib-fib reconstruction with intermittent lateral snapping in his knee.    Patient's medications, allergies, past medical, surgical, social and family histories were reviewed and updated as appropriate.    Review of Systems   All systems reviewed were negative.  Specifically, the patient denies fever, chills, weight loss, chest pain, shortness of breath, or dyspnea on exertion.      Past Medical History:   Diagnosis Date    Sickle cell trait        Objective:      Physical Exam  Patient is alert and oriented, no distress. Skin is intact. Neuro is normal with no focal motor or sensory findings.    Standing exam  stance: normal alignment, no significant leg-length discrepancy  gait: no limp      Knee      RIGHT  LEFT  Skin:     Intact   Intact  ROM:     0-130  3-130  Effusion:    Neg   Neg  Medial joint line tenderness:  Neg   Neg  Lateral joint line tenderness:  Neg   Neg  Mariano:     Neg   Neg  Patella crepitus:   Neg   +  Patella tenderness:   Neg   Neg  Patella grind:      Neg   Neg  Lachman:    Neg   Neg  Valgus stress:    Neg   Neg  Varus stress:    Neg   Neg  Posterior drawer:   Neg   Neg  N-V               intact  intact  Hip:    nml    nml   Lower extremity  edema: Negative negative    Neurovascular exam  - motor function grossly intact bilaterally to hip flexion, knee extension and flexion, ankle dorsiflexion and plantarflexion  - sensation intact to light touch bilaterally to femoral, tibial, tibial and peroneal distributions  - symmetrical pedal pulses    Imaging:  X-ray Knee Ortho Left with Flexion  Narrative: EXAMINATION:  XR KNEE ORTHO LEFT WITH FLEXION    CLINICAL HISTORY:  . Pain in left knee    TECHNIQUE:  AP standing view of both knees, PA flexion standing views of both knees, and Merchant views of both knees were performed. A lateral view of the left knee was also performed.    COMPARISON:  01/27/2022    FINDINGS:  No acute osseous abnormality with stable appearance of the knees bilaterally.  Left knee postsurgical findings present.  No large joint effusion.  Impression: As above    Electronically signed by: Aguila Talavera MD  Date:    09/16/2022  Time:    10:03       Assessment/Plan:   Assessment:  Gurpreet Jurado is a 21 y.o. male with left knee lateral snapping    Plan:    Discussed diagnosis and treatment options with him today.  He has persistent left knee snapping after his proximal tib-fib reconstruction.  He also has a video of it snapping with him today.  His x-ray shows maintained alignment of tib-fib reconstruction buttons, so I do not think it is his tib-fib joint that is snapping.  Rather, I suspected is here his ITB band or his popliteus.  Recommend dedicated physical therapy for treatment of snapping IT band at the level of the knee.  There are physical therapists that are at [facility] everyday of the week, so I think he will be able to get his therapy down there.  They should also try some dry needling other modalities as needed.  We discussed the possibility of further operative treatment to alleviate the snapping, but he reports that it only happens once every few weeks and he is able to play in spite of it, so he is not interested  in any kind of surgical treatment at this time.  Follow up with me as needed.        Zoran Ruiz MD

## 2022-11-10 DIAGNOSIS — M25.561 RIGHT KNEE PAIN, UNSPECIFIED CHRONICITY: Primary | ICD-10-CM

## 2022-11-11 ENCOUNTER — OFFICE VISIT (OUTPATIENT)
Dept: ORTHOPEDICS | Facility: CLINIC | Age: 22
End: 2022-11-11
Payer: COMMERCIAL

## 2022-11-11 ENCOUNTER — HOSPITAL ENCOUNTER (OUTPATIENT)
Dept: RADIOLOGY | Facility: HOSPITAL | Age: 22
Discharge: HOME | End: 2022-11-11
Attending: STUDENT IN AN ORGANIZED HEALTH CARE EDUCATION/TRAINING PROGRAM
Payer: COMMERCIAL

## 2022-11-11 VITALS — HEIGHT: 73 IN | BODY MASS INDEX: 24.92 KG/M2 | WEIGHT: 188 LBS

## 2022-11-11 DIAGNOSIS — M25.561 RIGHT KNEE PAIN, UNSPECIFIED CHRONICITY: ICD-10-CM

## 2022-11-11 DIAGNOSIS — S83.411A SPRAIN OF MEDIAL COLLATERAL LIGAMENT OF RIGHT KNEE, INITIAL ENCOUNTER: Primary | ICD-10-CM

## 2022-11-11 PROCEDURE — 3008F BODY MASS INDEX DOCD: CPT | Mod: CPTII,S$GLB,, | Performed by: STUDENT IN AN ORGANIZED HEALTH CARE EDUCATION/TRAINING PROGRAM

## 2022-11-11 PROCEDURE — 1159F MED LIST DOCD IN RCRD: CPT | Mod: CPTII,S$GLB,, | Performed by: STUDENT IN AN ORGANIZED HEALTH CARE EDUCATION/TRAINING PROGRAM

## 2022-11-11 PROCEDURE — 97116 PR GAIT TRAINING THERAPY: ICD-10-PCS | Mod: S$GLB,,, | Performed by: STUDENT IN AN ORGANIZED HEALTH CARE EDUCATION/TRAINING PROGRAM

## 2022-11-11 PROCEDURE — 97116 GAIT TRAINING THERAPY: CPT | Mod: S$GLB,,, | Performed by: STUDENT IN AN ORGANIZED HEALTH CARE EDUCATION/TRAINING PROGRAM

## 2022-11-11 PROCEDURE — 3008F PR BODY MASS INDEX (BMI) DOCUMENTED: ICD-10-PCS | Mod: CPTII,S$GLB,, | Performed by: STUDENT IN AN ORGANIZED HEALTH CARE EDUCATION/TRAINING PROGRAM

## 2022-11-11 PROCEDURE — 99999 PR PBB SHADOW E&M-EST. PATIENT-LVL III: CPT | Mod: PBBFAC,,, | Performed by: STUDENT IN AN ORGANIZED HEALTH CARE EDUCATION/TRAINING PROGRAM

## 2022-11-11 PROCEDURE — 1160F RVW MEDS BY RX/DR IN RCRD: CPT | Mod: CPTII,S$GLB,, | Performed by: STUDENT IN AN ORGANIZED HEALTH CARE EDUCATION/TRAINING PROGRAM

## 2022-11-11 PROCEDURE — 73564 X-RAY EXAM KNEE 4 OR MORE: CPT | Mod: TC,RT

## 2022-11-11 PROCEDURE — 73562 XR KNEE ORTHO RIGHT WITH FLEXION: ICD-10-PCS | Mod: 26,LT,, | Performed by: STUDENT IN AN ORGANIZED HEALTH CARE EDUCATION/TRAINING PROGRAM

## 2022-11-11 PROCEDURE — 73562 X-RAY EXAM OF KNEE 3: CPT | Mod: 26,LT,, | Performed by: STUDENT IN AN ORGANIZED HEALTH CARE EDUCATION/TRAINING PROGRAM

## 2022-11-11 PROCEDURE — 99214 OFFICE O/P EST MOD 30 MIN: CPT | Mod: S$GLB,,, | Performed by: STUDENT IN AN ORGANIZED HEALTH CARE EDUCATION/TRAINING PROGRAM

## 2022-11-11 PROCEDURE — 1159F PR MEDICATION LIST DOCUMENTED IN MEDICAL RECORD: ICD-10-PCS | Mod: CPTII,S$GLB,, | Performed by: STUDENT IN AN ORGANIZED HEALTH CARE EDUCATION/TRAINING PROGRAM

## 2022-11-11 PROCEDURE — 73564 X-RAY EXAM KNEE 4 OR MORE: CPT | Mod: 26,RT,, | Performed by: STUDENT IN AN ORGANIZED HEALTH CARE EDUCATION/TRAINING PROGRAM

## 2022-11-11 PROCEDURE — 1160F PR REVIEW ALL MEDS BY PRESCRIBER/CLIN PHARMACIST DOCUMENTED: ICD-10-PCS | Mod: CPTII,S$GLB,, | Performed by: STUDENT IN AN ORGANIZED HEALTH CARE EDUCATION/TRAINING PROGRAM

## 2022-11-11 PROCEDURE — 99214 PR OFFICE/OUTPT VISIT, EST, LEVL IV, 30-39 MIN: ICD-10-PCS | Mod: S$GLB,,, | Performed by: STUDENT IN AN ORGANIZED HEALTH CARE EDUCATION/TRAINING PROGRAM

## 2022-11-11 PROCEDURE — 99999 PR PBB SHADOW E&M-EST. PATIENT-LVL III: ICD-10-PCS | Mod: PBBFAC,,, | Performed by: STUDENT IN AN ORGANIZED HEALTH CARE EDUCATION/TRAINING PROGRAM

## 2022-11-11 PROCEDURE — 73564 XR KNEE ORTHO RIGHT WITH FLEXION: ICD-10-PCS | Mod: 26,RT,, | Performed by: STUDENT IN AN ORGANIZED HEALTH CARE EDUCATION/TRAINING PROGRAM

## 2022-11-11 NOTE — PROGRESS NOTES
Orthopaedics Sports Medicine     Knee Initial Visit         11/11/2022    Referring MD: No ref. provider found    Chief Complaint   Patient presents with    Right Knee - Injury, Swelling, Pain       History of Present Illness:   Gurpreet Jurado is a 21 y.o. year old male,  at , presents with pain and dysfunction involving the right knee.     Onset of the symptoms was 11/10/2022.     Inciting event: baserunning and tried to avoid a collision, landed awkwardly and twisted his knee.     Current symptoms include pain in the right knee, localized medially, worse with ROM and weight bearing.     Pain is aggravated by ROM, weight bearing.      Evaluation to date: XR.     Treatment to date: Rest, activity modifications, ice, oral anti-inflammatories.       Past Medical History:   Past Medical History:   Diagnosis Date    Sickle cell trait        Past Surgical History:   Past Surgical History:   Procedure Laterality Date    ARTHROSCOPY OF KNEE Left 7/2/2021    Procedure: ARTHROSCOPY, KNEE;  Surgeon: Montana Luke MD;  Location: [facility];  Service: Orthopedics;  Laterality: Left;  · Left knee peroneal nerve neurolysis    CHONDROPLASTY OF KNEE Left 7/2/2021    Procedure: CHONDROPLASTY, KNEE;  Surgeon: Montana Luke MD;  Location: [facility] OR;  Service: Orthopedics;  Laterality: Left;  Left knee chondroplasty of lateral tibial plateau    KNEE ARTHROSCOPY W/ MENISCECTOMY Left 7/2/2021    Procedure: ARTHROSCOPY, KNEE, WITH MENISCECTOMY;  Surgeon: Montana Luke MD;  Location: [facility] OR;  Service: Orthopedics;  Laterality: Left;  Left knee partial lateral meniscectomy approximately 10-15% of body anterior horn    left knee   2018       Medications:  Patient's Medications   New Prescriptions    No medications on file   Previous Medications    ASPIRIN (ECOTRIN) 325 MG EC TABLET    Take 1 tablet (325 mg total) by mouth once daily.    OXYCODONE-ACETAMINOPHEN (PERCOCET) 5-325 MG PER TABLET    Take 1 tablet  "by mouth every 6 (six) hours as needed for Pain.   Modified Medications    No medications on file   Discontinued Medications    No medications on file        Allergies: Review of patient's allergies indicates:  No Known Allergies    Social History:   [place]: ERNESTO Grissom  occupation: Student-athlete at [employer]  alcohol use: He reports no history of alcohol use.  tobacco use: He reports that he has never smoked. He has never used smokeless tobacco.    Review of systems:  history of recent illness, fevers, shakes, or chills: no  history of cardiac problems or chest pain: no  history of of pulmonary problems or asthma: no  history of diabetes: no  history of prior DVT or clotting problems: no  history of sleep apnea: no    Physical Examination:  Estimated body mass index is 24.8 kg/m² as calculated from the following:    Height as of this encounter: 6' 1" (1.854 m).    Weight as of this encounter: 85.3 kg (188 lb).    Standing exam  stance: normal alignment, no significant leg-length discrepancy  gait: Antalgic      Knee      RIGHT  LEFT  Skin:     Intact   Intact  ROM:     0-80  0-130  Effusion:    Neg   Neg  Medial joint line tenderness:  ++   Neg  Lateral joint line tenderness:  Neg   Neg  Mariano:     Neg   Neg  Patella crepitus:   Neg   Neg  Patella tenderness:   Neg   Neg  Patella grind:      Neg   Neg  Lachman:    Neg   Neg  Pivot shift:    Neg   Neg  Valgus stress:    +   Neg  Varus stress:    Neg   Neg  Posterior drawer:   Neg   Neg  N-V               intact  intact  Hip:    nml    nml   Lower extremity edema: Negative Negative    Tenderness at medial epicondyle    Neurovascular exam  - motor function grossly intact bilaterally to hip flexion, knee extension and flexion, ankle dorsiflexion and plantarflexion  - sensation intact to light touch bilaterally to femoral, tibial, tibial and peroneal distributions  - symmetrical pedal pulses    Imaging:  X-ray Knee Ortho Right with Flexion  Narrative: " EXAMINATION:  Four radiographic views of the RIGHT KNEE.    CLINICAL HISTORY:  Pain in right knee    TECHNIQUE:  Four radiographic views of the RIGHT KNEE.    COMPARISON:  Knee radiograph 09/16/2022.    FINDINGS:  Four views of the right knee demonstrate normal alignment.  There is no fracture.  There are postsurgical changes in the left knee.  There is no joint effusion.  There is no soft tissue swelling.  Impression: 1. No acute abnormality of the right knee.  2. Postsurgical change in the left knee, stable from the prior exam.    Electronically signed by: Clif Lester MD  Date:    11/11/2022  Time:    10:53       Physician Read: I agree with the above impression.    Impression:  21 y.o. male with right knee MCL sprain.     Plan:  Discussed diagnosis and treatment options with the patient today. His history and physical exam are most consistent with right knee MCL sprain.   I recommend hinged knee brace and protected weight bearing followed by rehab and return to play treatment  Will provide brace and crutches. Utilize crutches until able to ambulate without a limp. Under the direction of Zoran Ruiz MD, 10 minutes were spent sizing, fitting, and educating for durable medical equipment application today by Susan Pichardo, Orthopedic DME technician.  CPT 70781.  At least 10 minutes were spent performing gait training analysis, correction and instruction.  This service was performed by Robert Velázquez, Sports Medicine Assistant under direction from Zoran Ruiz MD.  CPT 51877-UV.  Referral for physical therapy at the [place] placed today as well.   Follow-up as needed.         Zoran Ruiz MD    I, Robert Velázquez, acted as a scribe for Zoran Ruiz MD for the duration of this office visit.

## 2023-01-17 ENCOUNTER — CLINICAL SUPPORT (OUTPATIENT)
Dept: REHABILITATION | Facility: HOSPITAL | Age: 23
End: 2023-01-17
Attending: STUDENT IN AN ORGANIZED HEALTH CARE EDUCATION/TRAINING PROGRAM
Payer: COMMERCIAL

## 2023-01-17 DIAGNOSIS — R68.89 DECREASED STRENGTH, ENDURANCE, AND MOBILITY: ICD-10-CM

## 2023-01-17 DIAGNOSIS — Z74.09 DECREASED STRENGTH, ENDURANCE, AND MOBILITY: ICD-10-CM

## 2023-01-17 DIAGNOSIS — R53.1 DECREASED STRENGTH, ENDURANCE, AND MOBILITY: ICD-10-CM

## 2023-01-17 DIAGNOSIS — S83.411A SPRAIN OF MEDIAL COLLATERAL LIGAMENT OF RIGHT KNEE, INITIAL ENCOUNTER: ICD-10-CM

## 2023-01-17 PROBLEM — M25.662 DECREASED RANGE OF MOTION OF LEFT KNEE: Status: RESOLVED | Noted: 2021-07-08 | Resolved: 2023-01-17

## 2023-01-17 PROBLEM — M25.562 ACUTE PAIN OF LEFT KNEE: Status: RESOLVED | Noted: 2021-07-08 | Resolved: 2023-01-17

## 2023-01-17 PROCEDURE — 97161 PT EVAL LOW COMPLEX 20 MIN: CPT

## 2023-01-17 PROCEDURE — 97110 THERAPEUTIC EXERCISES: CPT

## 2023-01-18 NOTE — PATIENT INSTRUCTIONS
HOME EXERCISE PROGRAM [[id number]]    PRONE QUAD STRETCH WITH BELT OR STRAP -  Repeat 5 Times, Hold 30 Seconds, Perform 3 Times a Day    QUAD STRETCH - STANDING -  Repeat 5 Times, Hold 30 Seconds, Perform 3 Times a Day

## 2023-01-18 NOTE — PLAN OF CARE
[facility]   Physical Therapy Initial Evaluation     Date: 1/17/2023     [id number]  Therapy Diagnosis:   Encounter Diagnoses   Name Primary?    Sprain of medial collateral ligament of right knee, initial encounter     Decreased strength, endurance, and mobility       Physician: Zoran Ruiz      Physician Orders: PT Eval and Treat  Medical Diagnosis from Referral: Sprain of medial collateral ligament of right knee, initial encounter [S83.411A]  Evaluation Date: 1/17/2023  Authorization Period Expiration: 11/11/23  Plan of Care Expiration: 4/17/23    Progress Update: 2/17/2023   Visit # / Visits authorized: 1 / 1   FOTO: Visit #1 1/17/2023 - Scored: 1 / 3     PRECAUTIONS: Standard Precautions     Patient School: [employer]   Job/Position: Student Athlete Baseball- Outfield     Time In: 1500  Time Out: 1530  Total Appointment Time (timed & untimed codes): 30    SUBJECTIVE     Date of onset: several months    History of current condition - Gurpreet is a 22 y.o. male whom presents to Physical Therapy with complaints of medial right knee pain that started after injury a few months ago.  He has been back to activity for the most part, but is still having pain with running and cutting.  He is not released at this time to baseball.       Falls: [] No  [x] Yes: injury related  Physician Instructions (per patient): not released, get into Physical Therapy to work on fusional deficits.   Other concerns: none    Imaging: [] Xray [x] MRI [] CT: Reviewed    Prior Therapy:  [] No  [x] Yes:   Social History: Pt lives with a friend [] House [] Apartment/Condo []other  Stairs: [] No  [] Yes:   Occupation: Patient is student athlete at [employer]  School/Work Restrictions: [x] None [] Yes:   Prior Level of Function: Independent with all activities of daily living  Current Level of Function: 60% of prior level of function  - Functional Deficits (based on  missing percentage): cutting with agility, endurance, speed  - Gym/Home Equipment: [employer] [employer] gym    Pain:  Current 0/10, worst 0/10, best 0 /10   Location: [] Right [x] Left [] Bilateral: medial knee joint line  Description: throbbing discomfort with activity  Aggravating Factors: running, cutting, change in direction  Easing Factors: activity avoidance, rest,     Pts goals: Pt reported goals are to decrease pain to get back to baseball without restriction.    _______________________________________________________  Medical History:   Past Medical History:   Diagnosis Date    Sickle cell trait        Surgical History:   Gurpreet Jurado  has a past surgical history that includes left knee  (2018); Arthroscopy of knee (Left, 7/2/2021); Knee arthroscopy w/ meniscectomy (Left, 7/2/2021); and Chondroplasty of knee (Left, 7/2/2021).    Medications:   Gurpreet has a current medication list which includes the following prescription(s): aspirin and oxycodone-acetaminophen.    Allergies:   Review of patient's allergies indicates:  No Known Allergies       OBJECTIVE     Posture:  Pt presents with postural abnormalities which include:    [x] Forward Head   [] Increased Lumbar Lordosis   [x] Rounded Shoulder   [] Flat Back Posture   [] Increased Thoracic Kyphosis [] Inominate Rotation   [] Increased Trunk Sway  [] Genu Recurvatum   [] Increased Trunk Rotation  [] Pes Planus   [] Other:     Sensation:  Sensation is [x] Intact [] Impaired-- to light touch    Palpation: Increased tone and tenderness noted with palpation to: medial knee pain    KNEE-   Knee   Range of Motion Right   Left   Goal   Hyper - Zero - Flexion (PRE)   10-0-140 2-0-145 0-0-135º   Hyper - Zero - Flexion (POST) Not tested  Not tested    (*) pain and/        LE STRENGTH -   Lower Extremity  Strength RIGHT   Goal   Hip Flexion  []1  []2  []3  [x]4  []5                []+ []- 5/5 B    Hip Extension  []1  []2  []3  [x]4  []5                []+ []-  5/5 B   Hip Abduction  []1  []2  []3  [x]4  []5                []+ []- 5/5 B   Hip Internal rotaiton  []1  []2  []3  [x]4  []5                []+ []- 5/5 B   Hip External rotation  []1  []2  []3  [x]4  []5                []+ []- 5/5 B   Knee Flexion []1  []2  []3  [x]4  []5                []+ []- 5/5 B   Knee Extension []1  []2  []3  [x]4  []5                []+ []- 5/5 B   Ankle Dorsiflexion []1  []2  []3  [x]4  []5                []+ []- 5/5 B   Ankle Plantarflexion []1  []2  []3  [x]4  []5                []+ []- 5/5 B   Ankle Inversion []1  []2  []3  [x]4  []5                []+ []- 5/5 B   Ankle Eversion []1  []2  []3  [x]4  []5                []+ []- 5/5 B   Big Toe Extension []1  []2  []3  [x]4  []5                []+ []- 5/5 B     Lower Extremity  Strength LEFT   Goal   Hip Flexion  []1  []2  []3  [x]4  []5                []+ []- 5/5 B    Hip Extension  []1  []2  []3  [x]4  []5                []+ []- 5/5 B   Hip Abduction  []1  []2  []3  [x]4  []5                []+ []- 5/5 B   Hip Internal rotaiton  []1  []2  []3  [x]4  []5                []+ []- 5/5 B   Hip External rotation  []1  []2  []3  [x]4  []5                []+ []- 5/5 B   Knee Flexion []1  []2  []3  [x]4  []5                []+ []- 5/5 B   Knee Extension []1  []2  []3  [x]4  []5                []+ []- 5/5 B   Ankle Dorsiflexion []1  []2  []3  [x]4  []5                []+ []- 5/5 B   Ankle Plantarflexion []1  []2  []3  [x]4  []5                []+ []- 5/5 B   Ankle Inversion []1  []2  []3  [x]4  []5                []+ []- 5/5 B   Ankle Eversion []1  []2  []3  [x]4  []5                []+ []- 5/5 B   Big Toe Extension []1  []2  []3  [x]4  []5                []+ []- 5/5 B        LE FLEXIBILITY-     Muscle Length  Upper Extremity Right   Limitation Left    Limitation Goal   Quadriceps  - prone heel to glute [] Normal  [x] Limited 10  inches [] Normal  [x] Limited 5  inches <4 inches   Hamstrings  - 90/90 TEst [] Normal  [x] Limited 40  degrees []  Normal  [x] Limited 40  degrees <30º   Piriformis   - JEWELL Test [] Normal  [x] Limited 8  inches [] Normal  [x] Limited 8  inches <6 inches   Gastrocnemius   - Forefoot to neutral [] Normal  [x] Limited    [] Normal  [x] Limited  > Neutral   Soleus   - Knee to Wall [] Normal  [x] Limited  [] Normal  [x] Limited  2 inches form wall       FUNCTION:     CMS Impairment/Limitation/Restriction for FOTO Knee Survey    Therapist reviewed FOTO scores for Gurpreet Jurado on 1/17/2023.   FOTO documents entered into [employer] - see Media section.    Limitation Score: To be determined on 2nd visit%         TREATMENT     Total Treatment time separate from Evaluation: (10) minutes    Gurpreet received the following interventions:     THERAPEUTIC EXERCISES: to develop strength, endurance, range of motion, flexibility, posture and core stabilization for   (10)  minutes including: x = performed today    TherEx Performed    Mobility / [place]: Upright Bike next Home exercise program review - all    Prone Quad Stretch- static x Strap, 30s holds   Standing Quad Stretch x 30s holds              BOLD = new this visit  Plan for Next Visit: dynamic warm up routine, Y balance testing / sit to stand testing, agility based program       PATIENT EDUCATION AND HOME EXERCISES     Education/Self-Care provided:  (included in treatment) minutes   Patient educated on the impairments noted above and the effects of physical therapy intervention to improve overall condition and Quality of Life  Patient was educated on all the above exercise prior/during/after for proper posture, positioning, and execution for safe performance with home exercise program.     Written Home Exercises Provided: yes. Prefers: [x] Printed [x] Electronic  Exercises were reviewed and Gurpreet was able to demonstrate them prior to the end of the session.  Gurpreet demonstrated good understanding of the education provided. See EMR under Patient Instructions for exercises provided  during therapy sessions.      ASSESSMENT     Gurpreet is a 22 y.o. male referred to outpatient Physical Therapy with a medical diagnosis of   Encounter Diagnoses   Name Primary?    Sprain of medial collateral ligament of right knee, initial encounter     Decreased strength, endurance, and mobility      Physical exam supports lower extremity (bilateral)  impairments including: decreased  flexibility, decreased muscular strength, decreased endurance, decreased muscular length/flexibility, and impaired functional mobility. The above impairments will be addressed through manual therapy techniques, therapeutic exercises, functional training, and modalities as necessary. Patient was treated and educated on exercises for home program, progression of therapy, and benefits of therapy to achieve full functional mobility.       Pt prognosis is Good.   Pt will benefit from skilled outpatient Physical Therapy to address the deficits stated above and in the chart below, provide pt/family education, and to maximize pt's level of independence.     Plan of care discussed with patient: Yes  Pt's spiritual, cultural and educational needs considered and patient is agreeable to the plan of care and goals as stated below:     Anticipated Barriers for therapy: transportation    Medical Necessity is demonstrated by the following:   Medical Necessity Boxes: LOWER QUARTER   History  Co-morbidities and personal factors that may impact the plan of care Co-morbidities:     Past Medical History:   Diagnosis Date    Sickle cell trait         Personal Factors:   coping style  lifestyle  attitudes     moderate   Examination  Body Structures and Functions, activity limitations and participation restrictions that may impact the plan of care Body Regions:   lower extremities    Body Systems:    gross symmetry  ROM  strength  gross coordinated movement  balance  gait  transfers  transitions  motor control  motor learning      Participation Restrictions:  "  See above in "Current Level of Function"     Activity limitations:   Learning and applying knowledge  no deficits    General Tasks and Commands  no deficits    Communication  no deficits    Mobility  walking  using transportation (bus, train, plane, car)  driving (bike, car, motorcycle)    Self care  washing oneself (bathing, drying, washing hands)  dressing    Domestic Life  shopping  cooking  doing house work (cleaning house, washing dishes, laundry)  assisting others    Interactions/Relationships  no deficits    Life Areas  no deficits    Community and Social Life  community life  recreation and leisure         high   Clinical Presentation stable and uncomplicated low   Decision Making/ Complexity Score: low       SHORT TERM GOALS:  4 weeks (2/17/23) Progress Date Met   Recent signs and systems trend is improving in order to progress towards Long term goals.  [] Met  [] Not Met  [] Progressing    Patient will be independent with Home Exercise Program  in order to further progress and return to maximal function. [] Met  [] Not Met  [] Progressing    Pain rating at Worst: 5 /10 in order to progress towards increased independence with activity. [] Met  [] Not Met  [] Progressing    Patient will be able to correct postural deviations in sitting and standing, to decrease pain and promote postural awareness for injury prevention.  [] Met  [] Not Met  [] Progressing    Patient will improve functional outcome (FOTO) score: by 5% to increase self-worth & perceived functional ability towards long term goals [] Met  [] Not Met  [] Progressing      LONG TERM GOALS: 12 weeks (4/17/23) Progress Date Met   Patient will return to normal activites of daily living, recreational, and work related activities with less pain and limitation.  [] Met  [] Not Met  [] Progressing    Patient will improve range of motion  to stated goals in order to return to maximal functional potential.  [] Met  [] Not Met  [] Progressing    Patient " will improve Strength to stated goals of appropriate musculature in order to improve functional independence.  [] Met  [] Not Met  [] Progressing    Pain Rating at Best: 1/10 to improve Quality of Life.  [] Met  [] Not Met  [] Progressing    Patient will meet predicted functional outcome (FOTO) score: to be determined% to increase self-worth & perceived functional ability. [] Met  [] Not Met  [] Progressing    Patient will have met/partially met personal goal of: to decrease pain to get back to baseball without restriction. [] Met  [] Not Met  [] Progressing          PLAN   Plan of care Certification: 1/17/2023 to 4/17/2023    Outpatient Physical Therapy 1 times weekly for 12 weeks to include any combination of the following interventions: virtual visits, dry needling, modalities, electrical stimulation (IFC, Pre-Mod, Attended with Functional Dry Needling), Gait Training, Manual Therapy, Moist Heat/ Ice, Neuromuscular Re-ed, Patient Education, Self Care, Therapeutic Exercise, Functional Training, and Therapeutic Activites     Thank you for this referral.    Najma Phan, PT      I CERTIFY THE NEED FOR THESE SERVICES FURNISHED UNDER THIS PLAN OF TREATMENT AND WHILE UNDER MY CARE   Physician's comments:     Physician's Signature: ___________________________________________________

## 2023-01-19 ENCOUNTER — CLINICAL SUPPORT (OUTPATIENT)
Dept: REHABILITATION | Facility: HOSPITAL | Age: 23
End: 2023-01-19
Payer: COMMERCIAL

## 2023-01-19 DIAGNOSIS — R53.1 DECREASED STRENGTH, ENDURANCE, AND MOBILITY: Primary | ICD-10-CM

## 2023-01-19 DIAGNOSIS — R68.89 DECREASED STRENGTH, ENDURANCE, AND MOBILITY: Primary | ICD-10-CM

## 2023-01-19 DIAGNOSIS — Z74.09 DECREASED STRENGTH, ENDURANCE, AND MOBILITY: Primary | ICD-10-CM

## 2023-01-19 PROCEDURE — 97110 THERAPEUTIC EXERCISES: CPT

## 2023-01-19 PROCEDURE — 97112 NEUROMUSCULAR REEDUCATION: CPT

## 2023-01-19 PROCEDURE — 97530 THERAPEUTIC ACTIVITIES: CPT

## 2023-01-19 NOTE — PROGRESS NOTES
[facility]   Physical Therapy Treatment Note     Name: Gurpreet Zimmer [facility] Number: [id number]    Therapy Diagnosis:   Encounter Diagnosis   Name Primary?    Decreased strength, endurance, and mobility Yes     Physician: Zoran Ruiz    Visit Date: 1/19/2023    Physician Orders: PT Eval and Treat  Medical Diagnosis from Referral: Sprain of medial collateral ligament of right knee, initial encounter [S83.411A]  Evaluation Date: 1/17/2023  Authorization Period Expiration: 11/11/23  Plan of Care Expiration: 4/17/23                    Progress Update: 2/17/2023   Visit # / Visits authorized: 1 / 1          FOTO: Visit #1 1/17/2023 - Scored: 1 / 3      PRECAUTIONS: Standard Precautions       Time In: 1300  Time Out: 1403  Total Billable Time: 54 minutes   SUBJECTIVE     Patient reports: he is feeling good with no symptoms. States he has not been working out his lower body a ton. Has done some squats but not with much weight. Has most difficulty with lateral work, cutting or rotational movements     He/She was compliant with home exercise program.  Response to previous treatment: improving knee flexion ROM following session  Functional change: pt     Pain: 0/10     Location: R knee    OBJECTIVE     Objective Measures updated at progress report only unless specified.       TREATMENT     Gurpreet received the treatments listed below:       THERAPEUTIC EXERCISES to develop strength, endurance, ROM, flexibility, posture, and core stabilization for (18) minutes including:    Intervention Performed Today    Upright bike (for lower extremity strength and endurance)  x Level 10 for 5 minutes    Dynamic stretching   x 1 lap each of kicks, knee grabs, sweeps, walking lunges, high knees, butt kicks  Quadricep grabs attempted but pt was unable to perform due to pain   Kneeling knee flexion stretch  x 3 minutes with 10s holds    Monster/lateral squat walks  x Green band, 3 laps each                             NEUROMUSCULAR RE-EDUCATION ACTIVITIES to improve Balance, Coordination, Kinesthetic, Sense, Proprioception, and Posture for (8) minutes.  The following were included:    Intervention Performed Today    Standing clamshells  x Blue band 3x10 B                                               THERAPEUTIC ACTIVITIES to improve dynamic and functional  performance for (28) minutes including:    Intervention Performed Today    Ladder drills  x 3 laps each   Quick feet forward and lateral, shuffles, scissors, double leg hops forward and lateral, single leg hops forward and later   3 way slider lunges  x 2x6 B with target for anterior lunge    Banded side shuffles  x Green band 3 laps                                       PATIENT EDUCATION AND HOME EXERCISES     Home Exercises Provided and Patient Education Provided     Education provided: (time included with therex) minutes  PURPOSE: Patient educated on the impairments noted above and the effects of physical therapy intervention to improve overall condition and QOL.   EXERCISE: Patient was educated on all the above exercise prior/during/after for proper posture, positioning, and execution for safe performance with home exercise program.     Written Home Exercises Provided: yes.  Exercises were reviewed and Alphard was able to demonstrate them prior to the end of the session.  Alphard demonstrated good  understanding of the education provided. See EMR under Patient Instructions for exercises provided during therapy sessions.    ASSESSMENT     Pt tolerated session well today. Attempted quadricep grabs but pt reports pain in the knee due to end range stiffness. Added kneeling knee flexion stretch with significant improvement in ROM noted following. Incorporated ladder drills to improve agility. Pt was frustrated with himself due to mild limitations in coordination which were not present prior to injury. Encouraged pt to be positive, noting that it will take a  little bit of time to return to prior level of function.     Gurpreet is progressing well towards his goals.   Pt prognosis is Excellent.     Pt will continue to benefit from skilled outpatient physical therapy to address the deficits listed in the problem list box on initial evaluation, provide pt/family education and to maximize pt's level of independence in the home and community environment.     Pt's spiritual, cultural and educational needs considered and pt agreeable to plan of care and goals.     Anticipated Barriers for therapy: transportation      SHORT TERM GOALS:  4 weeks (2/17/23) Progress Date Met   Recent signs and systems trend is improving in order to progress towards Long term goals.  [] Met  [] Not Met  [] Progressing     Patient will be independent with Home Exercise Program  in order to further progress and return to maximal function. [] Met  [] Not Met  [] Progressing     Pain rating at Worst: 5 /10 in order to progress towards increased independence with activity. [] Met  [] Not Met  [] Progressing     Patient will be able to correct postural deviations in sitting and standing, to decrease pain and promote postural awareness for injury prevention.  [] Met  [] Not Met  [] Progressing     Patient will improve functional outcome (FOTO) score: by 5% to increase self-worth & perceived functional ability towards long term goals [] Met  [] Not Met  [] Progressing        LONG TERM GOALS: 12 weeks (4/17/23) Progress Date Met   Patient will return to normal activites of daily living, recreational, and work related activities with less pain and limitation.  [] Met  [] Not Met  [] Progressing     Patient will improve range of motion  to stated goals in order to return to maximal functional potential.  [] Met  [] Not Met  [] Progressing     Patient will improve Strength to stated goals of appropriate musculature in order to improve functional independence.  [] Met  [] Not Met  [] Progressing     Pain Rating  at Best: 1/10 to improve Quality of Life.  [] Met  [] Not Met  [] Progressing     Patient will meet predicted functional outcome (FOTO) score: to be determined% to increase self-worth & perceived functional ability. [] Met  [] Not Met  [] Progressing     Patient will have met/partially met personal goal of: to decrease pain to get back to baseball without restriction. [] Met  [] Not Met  [] Progressing         PLAN     Continue Plan of Care (POC) and progress per patient tolerance. See treatment section for details on planned progressions next session.      Taylor sOpina, PT

## 2023-01-24 ENCOUNTER — CLINICAL SUPPORT (OUTPATIENT)
Dept: REHABILITATION | Facility: HOSPITAL | Age: 23
End: 2023-01-24
Payer: COMMERCIAL

## 2023-01-24 DIAGNOSIS — R53.1 DECREASED STRENGTH, ENDURANCE, AND MOBILITY: Primary | ICD-10-CM

## 2023-01-24 DIAGNOSIS — R68.89 DECREASED STRENGTH, ENDURANCE, AND MOBILITY: Primary | ICD-10-CM

## 2023-01-24 DIAGNOSIS — Z74.09 DECREASED STRENGTH, ENDURANCE, AND MOBILITY: Primary | ICD-10-CM

## 2023-01-24 PROCEDURE — 97530 THERAPEUTIC ACTIVITIES: CPT

## 2023-01-24 PROCEDURE — 97110 THERAPEUTIC EXERCISES: CPT

## 2023-01-24 NOTE — PROGRESS NOTES
[facility]   Physical Therapy Treatment Note     Name: Gurpreet Zimmer [facility] Number: [id number]    Therapy Diagnosis:   Encounter Diagnosis   Name Primary?    Decreased strength, endurance, and mobility Yes       Physician: Zoran Ruiz    Visit Date: 1/24/2023    Physician Orders: PT Eval and Treat  Medical Diagnosis from Referral: Sprain of medial collateral ligament of right knee, initial encounter [S83.411A]  Evaluation Date: 1/17/2023  Authorization Period Expiration: 11/11/23  Plan of Care Expiration: 4/17/23                    Progress Update: 2/17/2023   Visit # / Visits authorized: 2/25 (+1 for evaluation)          FOTO: Visit #1 1/17/2023 - Scored: 1 / 3      PRECAUTIONS: Standard Precautions       Time In: 1300 (FOTO Next Session)   Time Out: 1418  Total Billable Time: 70 minutes   SUBJECTIVE     Patient reports: he is feeling great with no symptoms. Has not started a return to run program. States he worked out his legs yesterday so his R hamstring is very sore     He/She was compliant with home exercise program.  Response to previous treatment: improving knee flexion ROM following session  Functional change: pt     Pain: 0/10     Location: R knee    OBJECTIVE     Objective Measures updated at progress report only unless specified.       TREATMENT     Gurpreet received the treatments listed below:       THERAPEUTIC EXERCISES to develop strength, endurance, ROM, flexibility, posture, and core stabilization for (24) minutes including:    Intervention Performed Today    Elliptical (for lower extremity strength and endurance)  x Level 10 for 5 minutes    Dynamic stretching   x 1 lap each of kicks, knee grabs, sweeps, walking lunges, high knees, butt kicks   Kneeling knee flexion stretch  x 3 minutes with 10s holds    Monster/lateral squat walks  x Black band, 3 laps each    Single leg eccentric calf raises  x 30# 3x10 B                       NEUROMUSCULAR  RE-EDUCATION ACTIVITIES to improve Balance, Coordination, Kinesthetic, Sense, Proprioception, and Posture for (0) minutes.  The following were included:    Intervention Performed Today    Standing clamshells   Blue band 3x10 B                                               THERAPEUTIC ACTIVITIES to improve dynamic and functional  performance for (46) minutes including:    Intervention Performed Today    Walk/run program  x 1 minute run:2 minute walk x 4 (12 minutes total)    Ladder drills  x 3 laps each   Quick feet forward and lateral, shuffles, scissors, double leg hops forward and lateral, single leg hops forward and later   3 way slider lunges   2x6 B with target for anterior lunge    Banded side shuffles   Green band 3 laps    Squat jumps  x 20x    Box jumps  x 20 inch 20x    DL to SL box jump  x 12 inch 20x B    SL box jump (3 ways)  x 6 inch: 15x B going forward, lateral and medial                        PATIENT EDUCATION AND HOME EXERCISES     Home Exercises Provided and Patient Education Provided     Education provided: (time included with therex) minutes  PURPOSE: Patient educated on the impairments noted above and the effects of physical therapy intervention to improve overall condition and QOL.   EXERCISE: Patient was educated on all the above exercise prior/during/after for proper posture, positioning, and execution for safe performance with home exercise program.     Written Home Exercises Provided: yes.  Exercises were reviewed and Alphard was able to demonstrate them prior to the end of the session.  Alphard demonstrated good  understanding of the education provided. See EMR under Patient Instructions for exercises provided during therapy sessions.    ASSESSMENT     Pt tolerated session well today. Initiated return to run program today; pt tolerated this well with no symptoms or compensations noted. Progressed plyometrics. Pt had some difficulty maintaining stability with single leg landing and  required mild cueing to prevent knee valgus; however, form improved with increased repetition in each direction.     Gurpreet is progressing well towards his goals.   Pt prognosis is Excellent.     Pt will continue to benefit from skilled outpatient physical therapy to address the deficits listed in the problem list box on initial evaluation, provide pt/family education and to maximize pt's level of independence in the home and community environment.     Pt's spiritual, cultural and educational needs considered and pt agreeable to plan of care and goals.     Anticipated Barriers for therapy: transportation      SHORT TERM GOALS:  4 weeks (2/17/23) Progress Date Met   Recent signs and systems trend is improving in order to progress towards Long term goals.  [] Met  [] Not Met  [x] Progressing     Patient will be independent with Home Exercise Program  in order to further progress and return to maximal function. [] Met  [] Not Met  [x] Progressing     Pain rating at Worst: 5 /10 in order to progress towards increased independence with activity. [x] Met  [] Not Met  [] Progressing  1/24/2023   Patient will be able to correct postural deviations in sitting and standing, to decrease pain and promote postural awareness for injury prevention.  [] Met  [] Not Met  [x] Progressing     Patient will improve functional outcome (FOTO) score: by 5% to increase self-worth & perceived functional ability towards long term goals [] Met  [] Not Met  [x] Progressing        LONG TERM GOALS: 12 weeks (4/17/23) Progress Date Met   Patient will return to normal activites of daily living, recreational, and work related activities with less pain and limitation.  [] Met  [] Not Met  [x] Progressing     Patient will improve range of motion  to stated goals in order to return to maximal functional potential.  [] Met  [] Not Met  [x] Progressing     Patient will improve Strength to stated goals of appropriate musculature in order to improve  functional independence.  [] Met  [] Not Met  [x] Progressing     Pain Rating at Best: 1/10 to improve Quality of Life.  [] Met  [] Not Met  [x] Progressing     Patient will meet predicted functional outcome (FOTO) score: to be determined% to increase self-worth & perceived functional ability. [] Met  [] Not Met  [x] Progressing     Patient will have met/partially met personal goal of: to decrease pain to get back to baseball without restriction. [] Met  [] Not Met  [x] Progressing         PLAN     Continue Plan of Care (POC) and progress per patient tolerance. See treatment section for details on planned progressions next session.      Taylor Ospina, PT

## 2023-01-26 NOTE — PATIENT INSTRUCTIONS
Return to Run Program    WALK/RUN RATIO: 12 MINUTES TOTAL    PHASE 1: 2:30 WALK/ :30 JOG    PHASE 2: 2:00 WALK/ 1:00 JOG    PHASE 3: 1:30 WALK/ 1:30 JOG    PHASE 4: 1:00 WALK/ 2:00 JOG    PHASE 5: :30 WALK/ 2:30 JOG    PHASE 6: 12:00 JOG    *PROGRESS AS TOLERATED      *always monitor your pain and adverse reactions.  If your body is telling you to stop, do not push through.     ADVERSE REACTIONS:   Redness, swelling, pain at night, sharp pain         Activity Modification Guidelines    1. Ok to Run/Play/Exercise with mild pain, no more than 3 out of 10 on pain scale, but need to stop activity if pain is moderate or 4 out of 10 and above.    2. Caveat - if pain with activity starts at worse than 3/10, but decreases within a few minutes, it's OK to push through.     3. No Running if limping or change in gait is noted    4. Advance mileage/weights by no more than 10% per week. Long run mileage shouldn't be more than about 30% total weekly mileage.

## 2023-01-31 ENCOUNTER — CLINICAL SUPPORT (OUTPATIENT)
Dept: REHABILITATION | Facility: HOSPITAL | Age: 23
End: 2023-01-31
Payer: COMMERCIAL

## 2023-01-31 DIAGNOSIS — R53.1 DECREASED STRENGTH, ENDURANCE, AND MOBILITY: Primary | ICD-10-CM

## 2023-01-31 DIAGNOSIS — R68.89 DECREASED STRENGTH, ENDURANCE, AND MOBILITY: Primary | ICD-10-CM

## 2023-01-31 DIAGNOSIS — Z74.09 DECREASED STRENGTH, ENDURANCE, AND MOBILITY: Primary | ICD-10-CM

## 2023-01-31 PROCEDURE — 97110 THERAPEUTIC EXERCISES: CPT

## 2023-01-31 PROCEDURE — 97140 MANUAL THERAPY 1/> REGIONS: CPT

## 2023-01-31 NOTE — PROGRESS NOTES
[facility]   Physical Therapy Treatment Note     Name: Gurpreet Zimmer [facility] Number: [id number]    Therapy Diagnosis:   Encounter Diagnosis   Name Primary?    Decreased strength, endurance, and mobility Yes       Physician: Zoran Ruiz    Visit Date: 1/31/2023    Physician Orders: PT Eval and Treat  Medical Diagnosis from Referral: Sprain of medial collateral ligament of right knee, initial encounter [S83.411A]  Evaluation Date: 1/17/2023  Authorization Period Expiration: 11/11/23  Plan of Care Expiration: 4/17/23                    Progress Update: 2/17/2023   Visit # / Visits authorized: 2/25 (+1 for evaluation)          FOTO: Visit #1 1/17/2023 - Scored: 1 / 3      PRECAUTIONS: Standard Precautions       Time In: 1300 (FOTO Next Session)   Time Out: 1418  Total Billable Time: 70 minutes   SUBJECTIVE     Patient reports: he is feeling great with no symptoms. Has not started a return to run program. States he worked out his legs yesterday so his R hamstring is very sore     He/She was compliant with home exercise program.  Response to previous treatment: improving knee flexion ROM following session  Functional change: pt     Pain: 0/10     Location: R knee    OBJECTIVE     Objective Measures updated at progress report only unless specified.       TREATMENT     Gurpreet received the treatments listed below:       THERAPEUTIC EXERCISES to develop strength, endurance, ROM, flexibility, posture, and core stabilization for (24) minutes including:    Intervention Performed Today    Elliptical (for lower extremity strength and endurance)  x Level 10 for 5 minutes    Dynamic stretching   x 1 lap each of kicks, knee grabs, sweeps, walking lunges, high knees, butt kicks   Kneeling knee flexion stretch  x 3 minutes with 10s holds    Monster/lateral squat walks  x Black band, 3 laps each    Single leg eccentric calf raises  x 30# 3x10 B                       NEUROMUSCULAR  RE-EDUCATION ACTIVITIES to improve Balance, Coordination, Kinesthetic, Sense, Proprioception, and Posture for (0) minutes.  The following were included:    Intervention Performed Today    Standing clamshells   Blue band 3x10 B                                               THERAPEUTIC ACTIVITIES to improve dynamic and functional  performance for (46) minutes including:    Intervention Performed Today    Walk/run program  x 1 minute run:2 minute walk x 4 (12 minutes total)    Ladder drills  x 3 laps each   Quick feet forward and lateral, shuffles, scissors, double leg hops forward and lateral, single leg hops forward and later   3 way slider lunges   2x6 B with target for anterior lunge    Banded side shuffles   Green band 3 laps    Squat jumps  x 20x    Box jumps  x 20 inch 20x    DL to SL box jump  x 12 inch 20x B    SL box jump (3 ways)  x 6 inch: 15x B going forward, lateral and medial                        PATIENT EDUCATION AND HOME EXERCISES     Home Exercises Provided and Patient Education Provided     Education provided: (time included with therex) minutes  PURPOSE: Patient educated on the impairments noted above and the effects of physical therapy intervention to improve overall condition and QOL.   EXERCISE: Patient was educated on all the above exercise prior/during/after for proper posture, positioning, and execution for safe performance with home exercise program.     Written Home Exercises Provided: yes.  Exercises were reviewed and Alphard was able to demonstrate them prior to the end of the session.  Alphard demonstrated good  understanding of the education provided. See EMR under Patient Instructions for exercises provided during therapy sessions.    ASSESSMENT     Pt tolerated session well today. Initiated return to run program today; pt tolerated this well with no symptoms or compensations noted. Progressed plyometrics. Pt had some difficulty maintaining stability with single leg landing and  required mild cueing to prevent knee valgus; however, form improved with increased repetition in each direction.     Gurpreet is progressing well towards his goals.   Pt prognosis is Excellent.     Pt will continue to benefit from skilled outpatient physical therapy to address the deficits listed in the problem list box on initial evaluation, provide pt/family education and to maximize pt's level of independence in the home and community environment.     Pt's spiritual, cultural and educational needs considered and pt agreeable to plan of care and goals.     Anticipated Barriers for therapy: transportation      SHORT TERM GOALS:  4 weeks (2/17/23) Progress Date Met   Recent signs and systems trend is improving in order to progress towards Long term goals.  [] Met  [] Not Met  [x] Progressing     Patient will be independent with Home Exercise Program  in order to further progress and return to maximal function. [] Met  [] Not Met  [x] Progressing     Pain rating at Worst: 5 /10 in order to progress towards increased independence with activity. [x] Met  [] Not Met  [] Progressing  1/24/2023   Patient will be able to correct postural deviations in sitting and standing, to decrease pain and promote postural awareness for injury prevention.  [] Met  [] Not Met  [x] Progressing     Patient will improve functional outcome (FOTO) score: by 5% to increase self-worth & perceived functional ability towards long term goals [] Met  [] Not Met  [x] Progressing        LONG TERM GOALS: 12 weeks (4/17/23) Progress Date Met   Patient will return to normal activites of daily living, recreational, and work related activities with less pain and limitation.  [] Met  [] Not Met  [x] Progressing     Patient will improve range of motion  to stated goals in order to return to maximal functional potential.  [] Met  [] Not Met  [x] Progressing     Patient will improve Strength to stated goals of appropriate musculature in order to improve  functional independence.  [] Met  [] Not Met  [x] Progressing     Pain Rating at Best: 1/10 to improve Quality of Life.  [] Met  [] Not Met  [x] Progressing     Patient will meet predicted functional outcome (FOTO) score: to be determined% to increase self-worth & perceived functional ability. [] Met  [] Not Met  [x] Progressing     Patient will have met/partially met personal goal of: to decrease pain to get back to baseball without restriction. [] Met  [] Not Met  [x] Progressing         PLAN     Continue Plan of Care (POC) and progress per patient tolerance. See treatment section for details on planned progressions next session.      Taylor Ospina, PT

## 2025-03-01 ENCOUNTER — OFFICE VISIT (OUTPATIENT)
Dept: URGENT CARE | Facility: CLINIC | Age: 25
End: 2025-03-01
Payer: COMMERCIAL

## 2025-03-01 VITALS
TEMPERATURE: 98 F | DIASTOLIC BLOOD PRESSURE: 64 MMHG | HEART RATE: 88 BPM | SYSTOLIC BLOOD PRESSURE: 112 MMHG | WEIGHT: 171.94 LBS | HEIGHT: 73 IN | BODY MASS INDEX: 22.79 KG/M2 | OXYGEN SATURATION: 100 % | RESPIRATION RATE: 14 BRPM

## 2025-03-01 DIAGNOSIS — R05.9 COUGH, UNSPECIFIED TYPE: ICD-10-CM

## 2025-03-01 DIAGNOSIS — B34.9 VIRAL SYNDROME: Primary | ICD-10-CM

## 2025-03-01 DIAGNOSIS — R11.0 NAUSEA: ICD-10-CM

## 2025-03-01 DIAGNOSIS — R51.9 ACUTE NONINTRACTABLE HEADACHE, UNSPECIFIED HEADACHE TYPE: ICD-10-CM

## 2025-03-01 LAB
CTP QC/QA: YES
CTP QC/QA: YES
POC MOLECULAR INFLUENZA A AGN: NEGATIVE
POC MOLECULAR INFLUENZA B AGN: NEGATIVE
SARS CORONAVIRUS 2 ANTIGEN: NEGATIVE

## 2025-03-01 PROCEDURE — 87502 INFLUENZA DNA AMP PROBE: CPT | Mod: QW,S$GLB,,

## 2025-03-01 PROCEDURE — 99203 OFFICE O/P NEW LOW 30 MIN: CPT | Mod: 25,S$GLB,,

## 2025-03-01 PROCEDURE — 96372 THER/PROPH/DIAG INJ SC/IM: CPT | Mod: S$GLB,,,

## 2025-03-01 PROCEDURE — 87811 SARS-COV-2 COVID19 W/OPTIC: CPT | Mod: QW,S$GLB,,

## 2025-03-01 RX ORDER — ONDANSETRON 4 MG/1
4 TABLET, ORALLY DISINTEGRATING ORAL EVERY 8 HOURS PRN
Qty: 12 TABLET | Refills: 0 | Status: SHIPPED | OUTPATIENT
Start: 2025-03-01

## 2025-03-01 RX ORDER — KETOROLAC TROMETHAMINE 30 MG/ML
30 INJECTION, SOLUTION INTRAMUSCULAR; INTRAVENOUS
Status: COMPLETED | OUTPATIENT
Start: 2025-03-01 | End: 2025-03-01

## 2025-03-01 RX ADMIN — KETOROLAC TROMETHAMINE 30 MG: 30 INJECTION, SOLUTION INTRAMUSCULAR; INTRAVENOUS at 10:03

## 2025-03-01 NOTE — LETTER
March 1, 2025      [facility] [address] [place] [address]  Phone: [phone]  Fax: [phone]       Patient: Gurpreet Jurado   YOB: 2000  Date of Visit: 03/01/2025    To Whom It May Concern:    Mat Jurado  was at [facility] on 03/01/2025. The patient may return to work/school on 03/04/2025 with no restrictions. If you have any questions or concerns, or if I can be of further assistance, please do not hesitate to contact me.    Sincerely,    Maya Cabrera PA-C

## 2025-03-01 NOTE — PROGRESS NOTES
"Subjective:      Patient ID: Gurpreet Jurado is a 24 y.o. male.    Vitals:  height is 6' 1" (1.854 m) and weight is 78 kg (171 lb 15.3 oz). His tympanic temperature is 98 °F (36.7 °C). His blood pressure is 112/64 and his pulse is 88. His respiration is 14 and oxygen saturation is 100%.     Chief Complaint: Headache    Gurpreet Jurado is a 24 y.o. male who presents for headache which onset 2 days ago. Headache is located frontal. It is aching in nature. Associated sxs include abd pain, nausea, cough, and generalized myalgias. Patient does not have hx of HA. Patient denies any fever, chills, v/d, neck pain, neck stiffness, visual disturbance, photophobia, extremity weakness/numbness, dizziness, lightheadedness. Not worst HA of life. No thunderclap. No vision changes. Prior Tx includes BC powder. Sick contacts.    Headache   This is a new problem. The current episode started in the past 7 days. The problem occurs constantly. The problem has been gradually worsening. The pain is located in the Frontal region. The pain does not radiate. The quality of the pain is described as aching. The pain is at a severity of 7/10. The pain is moderate. Associated symptoms include abdominal pain, coughing, muscle aches and nausea. Pertinent negatives include no abnormal behavior, anorexia, back pain, blurred vision, dizziness, drainage, ear pain, eye pain, eye redness, eye watering, facial sweating, fever, hearing loss, insomnia, loss of balance, neck pain, numbness, phonophobia, photophobia, rhinorrhea, scalp tenderness, seizures, sinus pressure, sore throat, swollen glands, tingling, tinnitus, visual change, vomiting, weakness or weight loss. The symptoms are aggravated by noise.       Constitution: Negative for fever.   HENT:  Negative for ear pain, tinnitus, hearing loss, sinus pressure and sore throat.    Neck: Negative for neck pain.   Eyes:  Negative for eye pain, eye redness, photophobia and blurred vision. "   Respiratory:  Positive for cough.    Gastrointestinal:  Positive for abdominal pain and nausea. Negative for vomiting.   Musculoskeletal:  Negative for back pain.   Neurological:  Positive for headaches. Negative for dizziness, loss of balance, numbness and seizures.   Psychiatric/Behavioral:  The patient does not have insomnia.       Objective:     Physical Exam   Constitutional: He is oriented to person, place, and time. He appears well-developed. He is cooperative.  Non-toxic appearance. He does not appear ill. No distress.   HENT:   Head: Normocephalic and atraumatic.   Ears:   Right Ear: Hearing, tympanic membrane, external ear and ear canal normal.   Left Ear: Hearing, tympanic membrane, external ear and ear canal normal.   Nose: Nose normal. No mucosal edema, rhinorrhea, nasal deformity or congestion. No epistaxis. Right sinus exhibits no maxillary sinus tenderness and no frontal sinus tenderness. Left sinus exhibits no maxillary sinus tenderness and no frontal sinus tenderness.   Mouth/Throat: Uvula is midline, oropharynx is clear and moist and mucous membranes are normal. Mucous membranes are moist. No trismus in the jaw. Normal dentition. No uvula swelling. No oropharyngeal exudate, posterior oropharyngeal edema or posterior oropharyngeal erythema.   Eyes: Conjunctivae and lids are normal. Pupils are equal, round, and reactive to light. Right conjunctiva is not injected. No scleral icterus. Right eye exhibits no nystagmus. Left eye exhibits no nystagmus. Extraocular movement intact vision grossly intact gaze aligned appropriately   Neck: Trachea normal and phonation normal. Neck supple. No edema present. No erythema present. No neck rigidity present.   Cardiovascular: Normal rate, regular rhythm, normal heart sounds and normal pulses.   Pulmonary/Chest: Effort normal and breath sounds normal. No stridor. No respiratory distress. He has no decreased breath sounds. He has no wheezes. He has no rhonchi. He  has no rales.   Abdominal: Normal appearance and bowel sounds are normal. He exhibits no distension. Soft. flat abdomen There is abdominal tenderness. There is no rebound and no guarding.   Musculoskeletal: Normal range of motion.         General: No deformity. Normal range of motion.   Neurological: no focal deficit. He is alert and oriented to person, place, and time. He has normal motor skills, normal sensation and intact cranial nerves (2-12). He displays facial symmetry. He exhibits normal muscle tone. He has a normal Finger-Nose-Finger Test. Coordination: Romberg sign negative. He shows no pronator drift. Gait and coordination normal. Coordination normal.   Skin: Skin is warm, dry, intact, not diaphoretic and not pale.   Psychiatric: His speech is normal and behavior is normal. Judgment and thought content normal.   Nursing note and vitals reviewed.      Assessment:     1. Viral syndrome    2. Acute nonintractable headache, unspecified headache type    3. Cough, unspecified type    4. Nausea        Results for orders placed or performed in visit on 03/01/25   POCT Influenza A/B MOLECULAR    Collection Time: 03/01/25 10:14 AM   Result Value Ref Range    POC Molecular Influenza A Ag Negative Negative    POC Molecular Influenza B Ag Negative Negative     Acceptable Yes    SARS Coronavirus 2 Antigen, POCT Manual Read    Collection Time: 03/01/25 10:15 AM   Result Value Ref Range    SARS Coronavirus 2 Antigen Negative Negative, Presumptive Negative     Acceptable Yes        Plan:       Viral syndrome    Acute nonintractable headache, unspecified headache type  -     POCT Influenza A/B MOLECULAR  -     SARS Coronavirus 2 Antigen, POCT Manual Read  -     ketorolac injection 30 mg    Cough, unspecified type    Nausea  -     ondansetron (ZOFRAN-ODT) 4 MG TbDL; Take 1 tablet (4 mg total) by mouth every 8 (eight) hours as needed (for nausea/vomiting).  Dispense: 12 tablet; Refill:  0      Afebrile. VSS.  No focal neurological deficits.   Discussed negative results with patient.   Patient's headache is either consistent with previous headache and/or lacks features concerning for emergent or life threatening condition.  I do not suspect SAH, meningitis, increased IC pressure, infectious, toxic, vascular, CNS, or other EMC.   DDx: tension HA, sinus HA, migraine HA, viral URI  Toradol IM given in clinic. CBC and CMP reviewed. Kidney function reviewed.  Meds: zofran sent to preferred pharmacy. Discussed side effects of medications. Do not drive or operate heavy machinery.  Increase fluid intake. Get plenty of rest.  Discussed common triggers and recommend avoidance of those triggers.  ER precautions given such as fever, hematochezia, hematemesis, chest pain, shortness of breath, funny heart beats, headache, blurred vision, weakness in one arm or leg, slurred speech, numbness, inability to walk or talk, confusion.

## 2025-03-01 NOTE — PATIENT INSTRUCTIONS
You received an injection of a powerful NSAID today - Toradol. Its effects will last up to 24 hours. Please do not take another NSAID (i.e. Aspirin, Ibuprofen, Aleve, Advil or Motrin) until this time tomorrow. If you continue to have pain, you may take Tylenol (acetaminophen) if you are not allergic to this medication.      Get rest and drink plenty of fluids    You can use tylenol or ibuprofen as directed as long as you don't have any allergies to these medications or medical conditions such as ulcers, liver or kidney disease or blood thinners etc that would prevent you from taking these meds.     Use the zofran as needed for nausea- it dissolves under your tongue.     Please go to the emergency room if you experience chest pain, shortness of breath, funny heart beats, headache, blurred vision, weakness in one arm or leg, slurred speech, numbness, inability to walk or talk, confusion.     Try to avoid common triggers of headaches (stress, menstruation, visual stimuli, weather changes, nitrates, fasting, wine, lack of sleep, smoking, odors, chocolate)     Please return or see your primary care doctor if you develop new or worsening symptoms.       Please arrange follow up with your primary medical clinic as soon as possible. You must understand that you've received an Urgent Care treatment only and that you may be released before all of your medical problems are known or treated. You, the patient, will arrange for follow up as instructed. If your symptoms worsen or fail to improve you should go to the Emergency Room.    WE CANNOT RULE OUT ALL POSSIBLE CAUSES OF YOUR SYMPTOMS IN THE URGENT CARE SETTING PLEASE GO TO THE ER IF YOU FEELS YOUR CONDITION IS WORSENING OR YOU WOULD LIKE EMERGENT EVALUATION.

## (undated) DEVICE — TOURNIQUET SB QC SP 24X4IN

## (undated) DEVICE — PAD CAST SPECIALIST STRL 4

## (undated) DEVICE — ELECTRODE REM PLYHSV RETURN 9

## (undated) DEVICE — BANDAGE ACE DOUBLE STER 6IN

## (undated) DEVICE — BLADE COOLCUT EXCALIBER 4X13

## (undated) DEVICE — UNDERGLOVE BIOGEL PI SZ 6.5 LF

## (undated) DEVICE — PAD ABD 8X10 STERILE

## (undated) DEVICE — NDL FILTER MICRON 18G 1 1/2

## (undated) DEVICE — SET CASSETTE TUBE DW OUTFLOW

## (undated) DEVICE — MANIFOLD 4 PORT

## (undated) DEVICE — POSITIONER HEAD DONUT 9IN FOAM

## (undated) DEVICE — TUBING PUMP ARTHROSCOPY STRL

## (undated) DEVICE — ADHESIVE MASTISOL VIAL 48/BX

## (undated) DEVICE — NDL HYPODERMIC BLUNT 18G 1.5IN

## (undated) DEVICE — UNDERGLOVES BIOGEL PI SIZE 7.5

## (undated) DEVICE — SUPPORT ULNA NERVE PROTECTOR

## (undated) DEVICE — CLOSURE SKIN STERI STRIP 1/2X4

## (undated) DEVICE — GLOVE BIOGEL SZ 8 1/2

## (undated) DEVICE — SEE MEDLINE ITEM 152523

## (undated) DEVICE — DRESSING XEROFORM FOIL PK 1X8

## (undated) DEVICE — SUT VICRYL CTD 2-0 GI 27 SH

## (undated) DEVICE — SUT MONOCRYL PLUS UD 3-0 27

## (undated) DEVICE — SUT VICRYL PLUS 2-0 CT1 18

## (undated) DEVICE — BRACE KNEE T SCOPE PREMIER

## (undated) DEVICE — COVER LIGHT HANDLE 80/CA

## (undated) DEVICE — APPLICATOR CHLORAPREP ORN 26ML

## (undated) DEVICE — PAD CAST SPECIALIST STRL 6

## (undated) DEVICE — SYR 10CC LUER LOCK

## (undated) DEVICE — SEE MEDLINE ITEM 157131

## (undated) DEVICE — BLADE SHAVER TORPEDO 4MMX13CM

## (undated) DEVICE — SPONGE LAP 18X18 PREWASHED

## (undated) DEVICE — SEE MEDLINE ITEM 146298

## (undated) DEVICE — DRAPE PLASTIC U 60X72

## (undated) DEVICE — SEE MEDLINE ITEM 157216

## (undated) DEVICE — SEE MEDLINE ITEM 157027

## (undated) DEVICE — UNDERGLOVES BIOGEL PI SIZE 8.5

## (undated) DEVICE — SEE MEDLINE ITEM 157117

## (undated) DEVICE — COVER CAMERA OPERATING ROOM

## (undated) DEVICE — DEV-O-LOOPS MAXI RED

## (undated) DEVICE — DRAPE MOBILE C-ARM

## (undated) DEVICE — MAT SURGICAL ECOSUCTIONER

## (undated) DEVICE — SEE MEDLINE ITEM 152529

## (undated) DEVICE — DRAPE INCISE IOBAN 2 23X17IN